# Patient Record
Sex: FEMALE | Race: BLACK OR AFRICAN AMERICAN | NOT HISPANIC OR LATINO | Employment: OTHER | URBAN - METROPOLITAN AREA
[De-identification: names, ages, dates, MRNs, and addresses within clinical notes are randomized per-mention and may not be internally consistent; named-entity substitution may affect disease eponyms.]

---

## 2017-05-17 ENCOUNTER — TELEPHONE (OUTPATIENT)
Dept: TRANSPLANT | Facility: CLINIC | Age: 52
End: 2017-05-17

## 2017-05-19 ENCOUNTER — DOCUMENTATION ONLY (OUTPATIENT)
Dept: TRANSPLANT | Facility: CLINIC | Age: 52
End: 2017-05-19

## 2017-05-19 NOTE — NURSING
Pt records reviewed.  Pt will be referred to Hepatology due to hcv cirrhosis. Initial referral received  From Tawanda Carroll's  office.   Referral letter sent to provider and patient.  Pt records reviewed.

## 2017-05-19 NOTE — LETTER
May 19, 2017    Tawanda Carroll  [address] [place] [id number]      Dear Dr. Carroll    Patient: Kasey Green   MR Number: [id number]   YOB: 1965     Thank you for the referral of Kasey Green to the [facility]. An initial appointment will be scheduled for your patient with one of our Hepatologists.      Thank you again for your trust in our program.  If there is anything we can do for you or your staff, please feel free to contact us.        Sincerely,        [facility]  [address] [place] [id number]  [phone]

## 2017-05-19 NOTE — LETTER
May 19, 2017    [facility]ase[facility] Green  [address] [facility] [address]      Dear Kasey Green:    Your doctor has referred you to the [facility]. You will be contacted by our office and an initial appointment will then be scheduled for you.    We look forward to seeing you soon. If you have any further questions, please contact us at [phone].       Sincerely,        [facility]   [address] [place] [id number]  [phone]

## 2017-05-25 ENCOUNTER — TELEPHONE (OUTPATIENT)
Dept: HEPATOLOGY | Facility: CLINIC | Age: 52
End: 2017-05-25

## 2017-05-25 NOTE — TELEPHONE ENCOUNTER
External records reviewed. HCV RNA and genotype 1a noted. CT abdomen done 8/24/16 suggestive of cirrhosis (Epic). Pt was admitted here in 8/2016 for GI bleed and HCV cirrhosis.    Spoke to pt. She needs appt in [place] with Dr. Ortiz. Informed pt that next available will likely be in July. Pt ok with waiting. She will need updated labs and imaging prior to appt. Order mailed to pt for CBC, CMP, INR, AFP, and U/s.   Asked that she have tests done by July 1st at [facility].  Verbalized understanding.     Pt will be contacted for scheduling once date is available.

## 2017-06-29 LAB — AFP-TUMOR MARKER: 12.7

## 2017-06-30 ENCOUNTER — TELEPHONE (OUTPATIENT)
Dept: HEPATOLOGY | Facility: CLINIC | Age: 52
End: 2017-06-30

## 2017-06-30 NOTE — TELEPHONE ENCOUNTER
Attempt made to reach patient.  Message from Dr. Ortiz left on her VM.  Ultrasound requested and report placed in provider folder for review by Dafne.

## 2017-06-30 NOTE — TELEPHONE ENCOUNTER
----- Message from Tiffanie Ortiz MD sent at 6/29/2017  3:23 PM CDT -----  Please inform patient tumor marker is slightly elevated, it could be due to hepatitis C.  Needs abdominal ultrasound.  Please Schedule.  Thanks

## 2017-07-07 ENCOUNTER — TELEPHONE (OUTPATIENT)
Dept: HEPATOLOGY | Facility: CLINIC | Age: 52
End: 2017-07-07

## 2017-07-07 NOTE — TELEPHONE ENCOUNTER
Pt had u/s done on 6/26. Report received from [facility], placed with external records. CBC, CMP, INR also requested (AFP has been entered in Epic)    Consult scheduled on 8/10 in [place]. Reminder mailed.

## 2017-07-11 LAB
ALBUMIN: 3.6
ALP ISOS SERPL LEV INH-CCNC: 108 U/L
ALT SERPL-CCNC: 47 U/L
ANION GAP SERPL CALC-SCNC: 13 MMOL/L
AST SERPL-CCNC: 74 U/L
BASOPHILS ABSOLUTE COUNT: 0 /ΜL
BASOPHILS NFR BLD: 1 % (ref 0–3)
BILIRUBIN, TOTAL: 1.1
BUN BLD-MCNC: 6 MG/DL (ref 4–21)
CALCIUM SERPL-MCNC: 9 MG/DL
CARBON DIOXIDE, CO2: 26
CHLORIDE: 103
CREAT SERPL-MCNC: 0.6 MG/DL
EOSINOPHIL NFR BLD: 5 %
EOSINOPHILS ABSOLUTE COUNT: 0 /ΜL
ERYTHROCYTE [DISTWIDTH] IN BLOOD BY AUTOMATED COUNT: 12.7 % (ref 11.5–14.5)
GFR: >90
GLUCOSE: 90
HCT VFR BLD AUTO: 38 % (ref 36–46)
HGB BLD-MCNC: 13.1 G/DL (ref 12–16)
IMMATURE GRANS (ABS): 0.01
IMMATURE GRANULOCYTES: 0.2
INR PPP: 1.1 (ref 2–3)
LYMPHOCYTES %: 41 % (ref 18–52)
LYMPHOCYTES ABSOLUTE COUNT: 2 /ΜL
MCH RBC QN AUTO: 32.9 PG (ref 26–34)
MCHC RBC AUTO-ENTMCNC: 35 G/DL (ref 30–37)
MCV RBC AUTO: 95.5 FL (ref 82–108)
MONOCYTES %: 9.1
MONOCYTES ABSOLUTE COUNT: 0.38
NEUTROPHILS ABSOLUTE COUNT: 2 /ΜL
NEUTROPHILS RELATIVE PERCENT: 45 % (ref 46–78)
NUCLEATED RBC/100 LEUKOCYTES: 0
PLATELET # BLD AUTO: 119 K/ΜL (ref 150–399)
PMV BLD AUTO: 10.5 FL (ref 7.5–11.5)
POTASSIUM: 3.6
PROTHROMBIN TIME: 12.3 S
RBC # BLD AUTO: 3.96 10^6/ΜL (ref 4–5.2)
SODIUM: 142
TOTAL PROTEIN: 8.8 G/DL (ref 6.4–8.2)
WBC: 4.2

## 2017-07-15 ENCOUNTER — TELEPHONE (OUTPATIENT)
Dept: HEPATOLOGY | Facility: CLINIC | Age: 52
End: 2017-07-15

## 2017-07-15 DIAGNOSIS — R74.01 ELEVATED TRANSAMINASE LEVEL: Primary | ICD-10-CM

## 2017-07-15 NOTE — TELEPHONE ENCOUNTER
Total protein 8.8, albumin 3.6, globulin 5.2 elevated.    Will get immunoglobulins quant, and SAMANTHA.  Pl send order to patient.

## 2017-07-25 ENCOUNTER — TELEPHONE (OUTPATIENT)
Dept: HEPATOLOGY | Facility: CLINIC | Age: 52
End: 2017-07-25

## 2017-07-25 DIAGNOSIS — B18.2 CHRONIC HEPATITIS C WITHOUT HEPATIC COMA: Primary | ICD-10-CM

## 2017-07-26 ENCOUNTER — DOCUMENTATION ONLY (OUTPATIENT)
Dept: TRANSPLANT | Facility: CLINIC | Age: 52
End: 2017-07-26

## 2017-07-26 NOTE — NURSING
Pt records reviewed.   Pt will be referred to Hepatitis C clinic    Initial referral received  from the workque.   Referring Provider/diagnosis  Tiffanie Ortiz MD    Diagnosis: Chronic hepatitis C without hepatic coma     Referral letter sent to provider and patient.

## 2017-08-10 ENCOUNTER — TELEPHONE (OUTPATIENT)
Dept: HEPATOLOGY | Facility: CLINIC | Age: 52
End: 2017-08-10

## 2017-08-10 NOTE — TELEPHONE ENCOUNTER
----- Message from Darlene Alvarez sent at 8/10/2017  9:43 AM CDT -----  Contact: PT  PT would like to reschedule her appt from today, due to transportation.    Call [phone]

## 2017-08-10 NOTE — TELEPHONE ENCOUNTER
MA called patient back, she said that the transportation people called her to let her know that nobody can bring her to her appt today. Reschedule her appt. Mailed new appt reminder to patient.CLOVER

## 2017-10-12 ENCOUNTER — EPISODE CHANGES (OUTPATIENT)
Dept: HEPATOLOGY | Facility: CLINIC | Age: 52
End: 2017-10-12

## 2017-10-12 ENCOUNTER — INITIAL CONSULT (OUTPATIENT)
Dept: HEPATOLOGY | Facility: CLINIC | Age: 52
End: 2017-10-12
Payer: MEDICAID

## 2017-10-12 VITALS
RESPIRATION RATE: 18 BRPM | SYSTOLIC BLOOD PRESSURE: 176 MMHG | BODY MASS INDEX: 21.62 KG/M2 | WEIGHT: 122 LBS | HEART RATE: 60 BPM | DIASTOLIC BLOOD PRESSURE: 116 MMHG | HEIGHT: 63 IN

## 2017-10-12 DIAGNOSIS — R93.89 ABNORMAL FINDING ON IMAGING: ICD-10-CM

## 2017-10-12 DIAGNOSIS — K74.60 CIRRHOSIS OF LIVER WITHOUT ASCITES, UNSPECIFIED HEPATIC CIRRHOSIS TYPE: ICD-10-CM

## 2017-10-12 DIAGNOSIS — B18.2 CHRONIC HEPATITIS C WITHOUT HEPATIC COMA: Primary | ICD-10-CM

## 2017-10-12 LAB
ALBUMIN: 3
ALP ISOS SERPL LEV INH-CCNC: 101 U/L
ALT SERPL-CCNC: 53 U/L
ANION GAP SERPL CALC-SCNC: 6 MMOL/L
AST SERPL-CCNC: 78 U/L
BASOPHILS ABSOLUTE COUNT: 0 /ΜL
BASOPHILS NFR BLD: 1 % (ref 0–3)
BILIRUBIN, TOTAL: 1.3
BUN BLD-MCNC: 7 MG/DL (ref 4–21)
CALCIUM SERPL-MCNC: 9.2 MG/DL
CARBON DIOXIDE, CO2: 25
CHLORIDE: 102
CREAT SERPL-MCNC: 0.9 MG/DL
EGFR IF AFRICAN AMERICAN: >60
EOSINOPHIL NFR BLD: 1.8 %
EOSINOPHILS ABSOLUTE COUNT: 0 /ΜL
ERYTHROCYTE [DISTWIDTH] IN BLOOD BY AUTOMATED COUNT: 44.7 %
EST. GFR  (NON AFRICAN AMERICAN): NORMAL MG/DL
GFR MDRD AF AMER: 83
GFR MDRD NON AF AMER: 69
GLUCOSE: 65
HCT VFR BLD AUTO: 38 % (ref 36–46)
HGB BLD-MCNC: 13 G/DL (ref 12–16)
IMMATURE GRANS (ABS): 0.02
IMMATURE GRANULOCYTES: 0.5
INR PPP: 1.1
LYMPH%: 49 % (ref 18–52)
LYMPHOCYTES ABSOLUTE COUNT: 2 /ΜL
MCH RBC QN AUTO: 32.1 PG
MCHC RBC AUTO-ENTMCNC: 34.4 G/DL
MCV RBC AUTO: 93.3 FL
MONO%: 9 % (ref 3–10)
MONOCYTES ABSOLUTE COUNT: 0.35
NEUTROPHILS - MAN (DIFF): 40
NEUTROPHILS ABSOLUTE COUNT: 2 /ΜL
NRBC: 0
NUCLEATED RBC-MANUAL: 0
PLATELETS: 80
POTASSIUM: 3.43
PROT SERPL-MCNC: 8.8 G/DL
PROTHROMBIN TIME: 12.9 S
RBC # BLD AUTO: 4.05 10^6/ΜL (ref 4–5.2)
SODIUM: 133
WBC: 3.87

## 2017-10-12 PROCEDURE — 99215 OFFICE O/P EST HI 40 MIN: CPT | Mod: S$GLB,,, | Performed by: INTERNAL MEDICINE

## 2017-10-12 PROCEDURE — 3008F BODY MASS INDEX DOCD: CPT | Mod: S$GLB,,, | Performed by: INTERNAL MEDICINE

## 2017-10-12 NOTE — PATIENT INSTRUCTIONS
-  Labs today:    CBC, CMP, PT INR, AFP,   -  Triple phase CT of the abdomen to be done locally at [facility] in [facility].  -  Submit CD with the above CT and the that was performed in the emergency room to IR conference for further evaluation of the area in the left lobe of the liver, and recommendation for further treatment/follow-up.

## 2017-10-12 NOTE — Clinical Note
-  Labs today:   CBC, CMP, PT INR, AFP,  -  Triple phase CT of the abdomen to be done locally at [facility] in [facility]. -  Submit CD with the above CT and the that was performed in the emergency room to IR conference for further evaluation of the area in the left lobe of the liver, and recommendation for further treatment/follow-up. neurologist to switch her medication, phenytoin, to another anti-seizure medication, observed 2-3 months for seizure recurrence on the new medication, and then return for hepatitis C treatment if no masses seen in the liver.   - CBC, CMP, PT INR today, followed by every 3 months -  Ultrasound of abdomen and AFP every 6 months, next due in April 2018   Return in 6-8 weeks.

## 2017-10-16 ENCOUNTER — TELEPHONE (OUTPATIENT)
Dept: HEPATOLOGY | Facility: CLINIC | Age: 52
End: 2017-10-16

## 2017-10-16 NOTE — TELEPHONE ENCOUNTER
MA attempted to call patient back she is unable to reached left her VM to please give us a callback .Devan

## 2017-10-16 NOTE — TELEPHONE ENCOUNTER
----- Message from Dave Stewart sent at 10/16/2017  3:13 PM CDT -----  Contact: patient   Patient have a medical question about her medication.      Please call [phone]      Thanks!!!

## 2017-10-18 ENCOUNTER — TELEPHONE (OUTPATIENT)
Dept: HEPATOLOGY | Facility: CLINIC | Age: 52
End: 2017-10-18

## 2017-10-18 LAB — AFP-TUMOR MARKER: 43

## 2017-10-18 NOTE — TELEPHONE ENCOUNTER
MA called patient back, patient stated that she talked to her Neurology MD about switching her Dilantin to Keppra per Dr. Ortiz.     She said that her Neurology MD would like a note from Dr. Ortiz on WHY he have to change her seisuze medicine? Per neurology MD he have a lot of patient that are on Hep C treatment taking the same medicine.     Route message to MD

## 2017-10-18 NOTE — TELEPHONE ENCOUNTER
----- Message from Darlene Alvarez sent at 10/17/2017 10:28 AM CDT -----  Contact: PT  Returning call, [phone]

## 2017-10-19 ENCOUNTER — TELEPHONE (OUTPATIENT)
Dept: HEPATOLOGY | Facility: CLINIC | Age: 52
End: 2017-10-19

## 2017-10-19 DIAGNOSIS — R77.2 ELEVATED AFP: Primary | ICD-10-CM

## 2017-10-19 DIAGNOSIS — K74.60 CIRRHOSIS OF LIVER WITHOUT ASCITES, UNSPECIFIED HEPATIC CIRRHOSIS TYPE: ICD-10-CM

## 2017-10-19 NOTE — TELEPHONE ENCOUNTER
Pl inform patient:  Tumor marker has increased from 13 to 43, may be due to hep C, or due to cirrhosis, or due to tumor in the liver.      Need triple phase CT scan of the abdomen ASAP to see if there is tumor in the liver. Pl schedule. Order placed.

## 2017-10-20 ENCOUNTER — TELEPHONE (OUTPATIENT)
Dept: HEPATOLOGY | Facility: CLINIC | Age: 52
End: 2017-10-20

## 2017-10-20 NOTE — TELEPHONE ENCOUNTER
MA called patient to inform her of her tumor marker results. Patient understood stated that she already schedule her CT-SCAN on 10/27 at 8:00 at [facility]. CLOVER

## 2017-10-20 NOTE — TELEPHONE ENCOUNTER
----- Message from Tiffanie Ortiz MD sent at 10/15/2017  9:56 AM CDT -----  Pl inform pt:  Liver enzymes mildly elevated consistent with hep C.  Liver and kidney working fairly well, as indicated by low MELD (8).  Tumor marker was mildly elevated in June, waiting for CT scan (gave paper order to patient during clinic visit). Pl send us the CD with CT, (and abd u/s, MRI, if any) for review in IR conference.

## 2017-10-23 ENCOUNTER — TELEPHONE (OUTPATIENT)
Dept: HEPATOLOGY | Facility: CLINIC | Age: 52
End: 2017-10-23

## 2017-10-23 NOTE — TELEPHONE ENCOUNTER
MA called patient inform her of her lab results below. Patient understood and verbalized understanding.Devan

## 2017-10-27 ENCOUNTER — TELEPHONE (OUTPATIENT)
Dept: HEPATOLOGY | Facility: CLINIC | Age: 52
End: 2017-10-27

## 2017-10-27 NOTE — TELEPHONE ENCOUNTER
----- Message from Darlene Alvarez sent at 10/26/2017  3:02 PM CDT -----  Contact: [facility]  Need order for CT, list of meds and lab results, patient has an appt tomorrow.     Fax   [place]

## 2017-11-03 ENCOUNTER — TELEPHONE (OUTPATIENT)
Dept: HEPATOLOGY | Facility: CLINIC | Age: 52
End: 2017-11-03

## 2017-11-03 NOTE — TELEPHONE ENCOUNTER
CT-SCAN DISC DATED 10/27 SENT TO THE 2ND FLOOR RADIOLOGY TO BE LOADED FOR NEXT IR CONF 11/7.   
no abdominal pain, no constipation, no diarrhea, no nausea and no vomiting.

## 2017-11-03 NOTE — TELEPHONE ENCOUNTER
.  Patient: Gagan Green       MRN: [id number]      : 1965     Age: 51 y.o.  [address]  [facility] [address]    Provider: Hepatologist Fabiano Ortiz    Patient Transplant Status: Other pending IR review    Reason for presentation: Initial staging for transplant    Clinical Summary: Patient with cirrhosis 2/2 ETOH abuse and Hepatitis C, rheumatoid arthritis, HTN, and seizure disorder on phenytoin, is referred here for hep C treatment.    hepatitis C was diagnosed approximately 2 years ago, she remains treatment naïve, she also has a history of alcohol in the past, her genotype is 1A, viral load was 5.1 million international units per mL in 2016, ultrasound of the abdomen from 2017 showed a nodular liver, borderline enlarged spleen, no mass was mentioned in an ultrasound.      weekend on , patient went to the local emergency room at [facility] in [place], [place], for severe right-sided abdominal pain.  A CT scan performed without contrast for a renal stone study showed a possible mass in the left lobe of the liver, due to the lack of contrast could not be confirmed.  Also seen were findings consistent with cirrhosis and mild enlargement of the spleen     Imaging to be reviewed: CT 10/27/17    HCC Treatment History: None    ABO: A POS    Platelets:   Lab Results   Component Value Date/Time     (A) 2017     Creatinine:   Lab Results   Component Value Date/Time    CREATININE 0.9 10/12/2017     Bilirubin:   Lab Results   Component Value Date/Time    BILITOT 2.3 (H) 2016 04:50 AM    BILITOT 2.3 (H) 2016 04:50 AM     AFP Last 3 each if available:   Lab Results   Component Value Date/Time    AFP 3.8 2016 04:43 PM       MELD: Computed MELD-Na score unavailable. Necessary lab results were not found in the last year.  Computed MELD score unavailable. Necessary lab results were not found in the last year.    Plan: CT from 10/27/17 shows a vague 3.6  cm area of hyperattenuation in the let lobe on non contrast portion with ? Washout and possible subtle enhancement.  Hounsfield units are a little higher that the liver parenchyma.  This is indeterminate. Rec MRI now.      Order entered by MD and note forwarded to hepatology staff to schedule.    Follow-up Provider: Tiffanie Ortiz MD

## 2017-11-05 ENCOUNTER — TELEPHONE (OUTPATIENT)
Dept: HEPATOLOGY | Facility: CLINIC | Age: 52
End: 2017-11-05

## 2017-11-05 NOTE — TELEPHONE ENCOUNTER
When I saw patient on October 12, 2017, she brought a report of an abdominal CT scan dated October 8, 2017, which mentioned a possible mass in the left lobe of the liver not adequately demonstrated on this noncontrast study.  Could be benign or malignant.  I requested a triple phase CT scan wound at the local hospital, downloaded on a CD, and sent to us at our address on [facility].    Please contact patient to see if she had this done and obtain the CD, so it can be submitted to the IR conference for review.  Thank you

## 2017-11-07 ENCOUNTER — TELEPHONE (OUTPATIENT)
Dept: HEPATOLOGY | Facility: CLINIC | Age: 52
End: 2017-11-07

## 2017-11-07 ENCOUNTER — CONFERENCE (OUTPATIENT)
Dept: TRANSPLANT | Facility: CLINIC | Age: 52
End: 2017-11-07

## 2017-11-07 DIAGNOSIS — R16.0 LIVER MASS: Primary | ICD-10-CM

## 2017-11-07 NOTE — TELEPHONE ENCOUNTER
Patient's liver imaging reviewed in IR conf today, 11/7/17.   Discussion:  Outside CT from 10/27/17 shows a vague 3.6 cm area of hyperattenuation in the let lobe on non contrast portion with ? Washout and possible subtle enhancement. This is indeterminate. Rec MRI now.    Order placed for MRI w/wo contrast, Pl schedule MRI at main ampus if patient agreeable, otherwise, send order to do it locally.

## 2017-11-08 NOTE — TELEPHONE ENCOUNTER
MA called patient inform him that per Dr. Ortiz she need to have MRI done. Mailed her the external order with a request to the DISC to be mail to us ASAP.     Fed ex info was given.     Patient will call [facility] [place] to schedule her MRI.     CLOVER

## 2017-11-09 ENCOUNTER — TELEPHONE (OUTPATIENT)
Dept: HEPATOLOGY | Facility: CLINIC | Age: 52
End: 2017-11-09

## 2017-11-09 ENCOUNTER — EPISODE CHANGES (OUTPATIENT)
Dept: HEPATOLOGY | Facility: CLINIC | Age: 52
End: 2017-11-09

## 2017-11-09 NOTE — TELEPHONE ENCOUNTER
Dr Carroll , patient's PCP, called [facility] clinic nurse, he reported neurologist ordered MRI wo contrast, which was done on 10/18/17, it showed diffuse patchy intermediate T1, T2 signal intensity throughout bone marrow which is concerning for metastatic bone marrow disease.  Splenomegaly. Disc bulge L4-L5.     I had requested MRI w/wo contrast of abd, due to indeterminate lesion in the liver, that is still pending. If mets exist, she will not be a candidate for liver transplant.

## 2017-11-15 ENCOUNTER — TELEPHONE (OUTPATIENT)
Dept: HEPATOLOGY | Facility: CLINIC | Age: 52
End: 2017-11-15

## 2017-11-15 NOTE — TELEPHONE ENCOUNTER
----- Message from Eleanor Fish sent at 11/14/2017 11:25 AM CST -----  Contact: [facility]/April  Needs a call patient is having an MRI and it needs a PA done prior please call @ # [phone]  Lunch there from 12-1

## 2017-11-15 NOTE — TELEPHONE ENCOUNTER
MA called April back, she need PA for patient MRI patient is not scheduled yet they need PA for now then they are going to schedule patient     [facility]    PHONE# [phone]  FAX# [phone]    TAX ID # [id number]        PA forward to MIRTHA VOGEL for approval. CLOVER

## 2017-11-30 ENCOUNTER — TELEPHONE (OUTPATIENT)
Dept: HEPATOLOGY | Facility: CLINIC | Age: 52
End: 2017-11-30

## 2017-11-30 NOTE — TELEPHONE ENCOUNTER
MA called patient back, she said that we have to fax her PA    [facility]     PHONE# [phone]  FAX# [phone]     TAX ID # [id number]    Faxed authorization to the fax # above. ATTENTION April.

## 2017-11-30 NOTE — TELEPHONE ENCOUNTER
----- Message from Janice Lang sent at 11/30/2017  3:31 PM CST -----  Contact: PT  PT need to schedule appt for MRI    Please call [phone]    Thanks Janice

## 2017-12-14 ENCOUNTER — OFFICE VISIT (OUTPATIENT)
Dept: HEPATOLOGY | Facility: CLINIC | Age: 52
End: 2017-12-14
Payer: MEDICAID

## 2017-12-14 DIAGNOSIS — R93.2 ABNORMAL LIVER DIAGNOSTIC IMAGING: ICD-10-CM

## 2017-12-14 DIAGNOSIS — K70.30 ALCOHOLIC CIRRHOSIS OF LIVER WITHOUT ASCITES: Primary | ICD-10-CM

## 2017-12-14 DIAGNOSIS — B18.2 HEP C W/O COMA, CHRONIC: ICD-10-CM

## 2017-12-14 PROCEDURE — 99214 OFFICE O/P EST MOD 30 MIN: CPT | Mod: S$GLB,,, | Performed by: INTERNAL MEDICINE

## 2017-12-14 NOTE — PATIENT INSTRUCTIONS
Recommendations:  -  Get Spine and abd mRIs on disc  -  Submit CD to IR conference for further evaluation of the area in the left lobe of the liver, and recommendation for further treatment/follow-up.  -  Patient has been advised to see her neurologist to switch her medication, phenytoin, to another anti-seizure medication, observed 2-3 months for seizure recurrence on the new medication, and then return for hepatitis C treatment if no masses seen in the liver.    -  If a mass is seen in the liver and no bone mets, we prefer to wait until a possible liver transplant for instituting hepatitis C treatment.  -  If she has bone mets, she needs to see oncologist.   -  Low salt in the diet, avoid canned, bottled and processed foods.  -  Continue current meds  -  CBC, CMP, PT INR today, followed by every 3 months  -  Ultrasound of abdomen and AFP every 6 months, next due in April 2018  -  Avoid alcohol, smoking, sedatives and meds with codeine.  -  No driving  -  Avoid high intake of Tylenol (more than 4 extra-strength pills in one day)  -  Call us if any bleeding, fevers, confusion, disorientation occur  -  Endoscopy: Per local GI physician  -  Transplant option discussed, will evaluate if masses seen in the liver or when MELD >15.  -  Return in 6-8 weeks.

## 2017-12-14 NOTE — Clinical Note
Recommendations: -  Get Spine and abd mRIs on disc -  Submit CD to IR conference for further evaluation of the area in the left lobe of the liver, and recommendation for further treatment/follow-up. -  Patient has been advised to see her neurologist to switch her medication, phenytoin, to another anti-seizure medication, observed 2-3 months for seizure recurrence on the new medication, and then return for hepatitis C treatment if no masses seen in the liver.   -  If a mass is seen in the liver and no bone mets, we prefer to wait until a possible liver transplant for instituting hepatitis C treatment. -  If she has bone mets, she needs to see oncologist.  -  CBC, CMP, PT INR today, followed by every 3 months -  Ultrasound of abdomen and AFP every 6 months, next due in April 2018 -  Return in 6-8 weeks.

## 2017-12-14 NOTE — PROGRESS NOTES
[facility] Follow-up Note    Reason for Consult:  The primary encounter diagnosis was Alcoholic cirrhosis of liver without ascites. Diagnoses of Hep C w/o coma, chronic and Abnormal liver diagnostic imaging were also pertinent to this visit.    PCP: Primary Doctor No       HPI:  This is a 52 y.o. female here for evaluation of: hep C.    Patient with cirrhosis 2/2 ETOH abuse and Hepatitis C, rheumatoid arthritis, HTN, and seizure disorder on phenytoin, is referred here for hep C treatment.      Offers no new symptoms, continues to have chr back pain.    Her hepatitis C was diagnosed approximately 2 years ago, she remains treatment naïve, she also has a history of alcohol in the past, her genotype is 1A, viral load was 5.1 million international units per mL in 2016, ultrasound of the abdomen from June 26, 2017 showed a nodular liver, borderline enlarged spleen, no mass was mentioned in an ultrasound.      Her labs on June 26, 2017 are significant for AST 74, ALT 47, total bilirubin 1.1, alpha-fetoprotein 12.7, INR 1.2, creatinine normal, GFR greater than 60.  Her hepatitis surface antibody is positive, hepatitis B antibody total was positive, HIV was negative, hemoglobin A1c was 4.7, rheumatoid arthritis factor was positive.    On October 8/9, 2017, patient went to the local emergency room at [facility] in [place], [place], for severe right-sided abdominal pain which originated in the low back and radiated towards the right flank and to the right side of the abdomen. A CT scan performed without contrast for a renal stone study showed a possible mass in the left lobe of the liver, due to the lack of contrast could not be confirmed.  Also seen were findings consistent with cirrhosis and mild enlargement of the spleen.    Interval History:  Dr Carroll , patient's PCP Phone [phone], called [facility] clinic nurse, he reported neurologist Dr. Jose A Huff (phone [phone]) had  ordered MRI wo contrast, which was done on 10/18/17, it showed diffuse patchy intermediate T1, T2 signal intensity throughout bone marrow which is concerning for metastatic bone marrow disease.  Splenomegaly. Disc bulge L4-L5.      I had requested MRI w/wo contrast of abd, due to indeterminate lesion in the liver, that is still pending. If mets exist, she will not be a candidate for liver transplant.     Previous admission to [facility] for UGI bleed 8/30/16:  Transferred from [facility] for evaluation of BRBPR. Patient was admitted to the ICU there after having 3 seizures at home and found to be in septic shock 2/2 E coli bacteremia. Patient subsequently developed DIC, which improved while she was treated for pneumonia with Vanc and Zosyn. On hospital admission Day#5, patient presented with abdominal distention and nausea and imaging showing high-grade small bowel obstruction. After failing conservative management, she underwent an ex-lap with SB partial resection and lysis of adhesion on 8/9. Postop course was complicated by worsening DIC requiring multiple transfusions of pRBC, FFP, and cryo. Patient also developed maroon-colored stools, and was managed with octretide and protonix gtt.      Scopes  EGD 8/17 - esophageal varices but no active bleeding, no report of bands being placed   Colonoscopy 8/20 - poor prep, there was evidence of old blood but no active bleeding     Course of Principle Problem for Admission:   Patient had repeat endoscopy performed on admission here to [facility] due to recurrent hematochezia with anemia requiring transfusion. EGD showed evidence of grade i esophageal varices, portal hypertensive gastropathy, normal duodenum. Colonoscopy showed evidence of poor prep, with blood in the entire examined colon with no bleeding site found, thought to be small bowel in origin with most likely source being the anastomosis. Hg remained stable since that time with no recurrent episodes of  hematochezia, as such patient will be transferred back to [facility] for continued care from her surgeons who performed recent bowel resection. Coagulopathy managed while inpatient with PRN transfusions of cryoprecipitate and FFP to maintain INR<1.5.     Other Medical Problems Addressed in the Hospital:   Patient also completed treatment of E Coli sepsis, and will transferred on PO Cipro daily for SBP prophylaxis despite no evidence of ascites amenable to paracentesis.        Pertinent/Significant Diagnostic Studies:    1. EGD/Colonoscopy: detailed above  2. CT Abdomen: Postsurgical bowel changes, noting mildly prominent small bowel loops in the midabdomen, increased in caliber as compared to the previous examination. This may reflect reactive ileus in this patient with suspected intra-abdominal hematoma and ascites, differential would include slow flow through the anastomotic site or possibly partial small bowel obstruction on the basis of adhesions although no sharp transition point seen. No free air or extraluminal contrast extravasation appreciated.      Elevated liver enzymes: Yes  Abnormal imaging: Yes  Cirrhosis: Yes  Hepatitis C: Yes  Hepatitis B: No  Fatty liver: Yes  Encephalopathy: No  Post-hospital discharge: No  Symptoms: none    Primary hepatic manifestations:  Fatigue:No  Edema:No  Ascites:No  Encephalopathy:No  Abdominal pain:No  GI bleeds: Yes  Pruritus:No  Weight Changes:No  Changes in Bowel habits: No  Muscle cramps:No    Risk factors for liver disease:  No jaundice  received transfusions in 2016 for UGI bleed  No IVDU  Did not snort cocaine or similar agents  Did not live with anyone with hepatitis B or C  Sexual partner not tested  No hepatotoxic medications  No exposure to industrial toxins  Alcohol: used to drink, stopped 6 months ago, approx 4/2017      ROS:  Constitutional: No fevers, chills, weight changes, fatigue  ENT: No allergies, nosebleeds,   CV: No chest pain  Pulm:  No cough, shortness of breath  Ophtho: No vision changes  GI/Liver: see HPI  Derm: No rash, itching  Heme: No swollen glands, bruising  MSK: No joint pains, joint swelling  : No dysuria, hematuria, decrease in urine output  Endo: No hot or cold intolerance  Neuro: No confusion, disorientation, difficulty with sleep, memory, concentration, syncope, seizure  Psych: No anxiety, depression    Medical History:  has a past medical history of Arthritis; Chronic pain; Cirrhosis of liver; Hepatitis; Hypertension; and Seizures.    Surgical History:  has a past surgical history that includes Hysterectomy; Hernia repair; Cholecystectomy; Abdominal surgery; and Colonoscopy (N/A, 8/25/2016).    Family History: family history is not on file..     Social History:  reports that she has quit smoking. She does not have any smokeless tobacco history on file. She reports that she drinks alcohol. She reports that she does not use drugs.    Review of patient's allergies indicates:  No Known Allergies    Current Outpatient Prescriptions   Medication Sig    levetiracetam (KEPPRA) 500 MG Tab Take 500 mg by mouth once daily.    megestrol (MEGACE) 400 mg/10 mL (40 mg/mL) Susp Take 800 mg by mouth once daily.    ondansetron HCl, PF, 4 mg/2 mL Soln Inject 8 mg into the vein every 8 (eight) hours as needed.    pantoprazole (PROTONIX) 40 MG tablet Take 1 tablet (40 mg total) by mouth once daily.    phenytoin (DILANTIN) 100 MG ER capsule Take 100 mg by mouth 3 (three) times daily.     propranolol (INDERAL) 20 MG tablet Take 1 tablet (20 mg total) by mouth 2 (two) times daily.    ramelteon (ROZEREM) 8 mg tablet Take 1 tablet (8 mg total) by mouth nightly as needed for Insomnia.    senna-docusate 8.6-50 mg (PERICOLACE) 8.6-50 mg per tablet Take 1 tablet by mouth once daily.     No current facility-administered medications for this visit.        Objective Findings:    Vital Signs:  LMP  (LMP Unknown)   There is no height or weight on file to  calculate BMI.    Physical Exam:  General Appearance: Well appearing in no acute distress  Head:   Normocephalic, without obvious abnormality  Eyes:    No scleral icterus, EOMI  ENT: Neck supple, Lips, mucosa, and tongue normal; teeth and gums normal  Lungs: CTA bilaterally in anterior and posterior fields, no wheezes, no crackles.  Heart:  Regular rate and rhythm, S1, S2 normal, no murmurs heard  Abdomen: Soft, non tender, non distended with positive bowel sounds in all four quadrants. No hepatosplenomegaly, ascites, or mass  Extremities: 2+ pulses, no clubbing, cyanosis or edema  Skin: No rash  Neurologic: CN II-XII intact, alert, oriented x 3. No asterixis      Labs:  Lab Results   Component Value Date    WBC 3.87 10/12/2017    HGB 13.0 10/12/2017    HCT 38 10/12/2017     (A) 06/26/2017    INR 1.1 10/12/2017    CREATININE 0.9 10/12/2017    BUN 7 10/12/2017    BILITOT 2.3 (H) 08/30/2016    BILITOT 2.3 (H) 08/30/2016    ALT 53 10/12/2017    AST 78 10/12/2017    ALKPHOS 55 08/30/2016    ALKPHOS 55 08/30/2016     10/12/2017    K 3.43 10/12/2017     10/12/2017    CO2 25 10/12/2017    TSH 2.761 08/20/2016    AFP 3.8 08/21/2016       Imaging:       Endoscopy:      Assessment:  1. Alcoholic cirrhosis of liver without ascites    2. Hep C w/o coma, chronic    3. Abnormal liver diagnostic imaging         Hepatitis C genotype 1A, treatment naïve, needs to be treated after confirming the status of the liver mass that was seen on a CT scan on October 8/9 2017, and follow-up MRI on Dec 4, 2017.  If she has confirmed HCC, would wait until after potential transplant for treatment for hep C will be started.  If, on the other hand, there is no mass found in the liver, then we will proceed with Hep C treatment.  However, she needs to be off the phenytoin and switch to another anti-seizure medication which will not interact with Hep C medications.      CT reviewed in IR conf on 11/7/17:  Plan: CT from 10/27/17  shows a vague 3.6 cm area of hyperattenuation in the let lobe on non contrast portion with ? Washout and possible subtle enhancement.  Hounsfield units are a little higher that the liver parenchyma.  This is indeterminate. Rec MRI now. it was finally done on 12/4/17, and no enhancing mass was seen on MRI.       In order to make the switch of anti-seizure medications, she saw a neurologist, Dr Huff, who obtained and mRI of spine because of chronic back pain.. Patient unaware of any positive mRI spine findings but her PCP, Dr Carroll ([phone]) called our [facility] nurse and reported that neurologist Dr. Jose A Huff (phone [phone]) had ordered MRI wo contrast, which was done on 10/18/17, it showed diffuse patchy intermediate T1, T2 signal intensity throughout bone marrow which is concerning for metastatic bone marrow disease.  Splenomegaly. Disc bulge L4-L5.       Recommendations:  -  Get Spine and abd mRIs on disc  -  Submit CD to IR conference for further evaluation of the area in the left lobe of the liver, and recommendation for further treatment/follow-up.  -  Patient has been advised to see her neurologist to switch her medication, phenytoin, to another anti-seizure medication, observed 2-3 months for seizure recurrence on the new medication, and then return for hepatitis C treatment if no masses seen in the liver.    -  If a mass is seen in the liver and no bone mets, we prefer to wait until a possible liver transplant for instituting hepatitis C treatment.  -  If she has bone mets, she needs to see oncologist.   -  Low salt in the diet, avoid canned, bottled and processed foods.  -  Continue current meds  -  CBC, CMP, PT INR today, followed by every 3 months  -  Ultrasound of abdomen and AFP every 6 months, next due in April 2018  -  Avoid alcohol, smoking, sedatives and meds with codeine.  -  No driving  -  Avoid high intake of Tylenol (more than 4 extra-strength pills in one day)  -  Call  us if any bleeding, fevers, confusion, disorientation occur  -  Endoscopy: Per local GI physician  -  Transplant option discussed, will evaluate if masses seen in the liver or when MELD >15.  -  Return in 6-8 weeks.          Return in about 8 weeks (around 2/8/2018).      Order summary:  No orders of the defined types were placed in this encounter.      Thank you so much for allowing me to participate in the care of Gagan Ortiz MD

## 2017-12-21 ENCOUNTER — EPISODE CHANGES (OUTPATIENT)
Dept: HEPATOLOGY | Facility: CLINIC | Age: 52
End: 2017-12-21

## 2017-12-26 ENCOUNTER — TELEPHONE (OUTPATIENT)
Dept: HEPATOLOGY | Facility: CLINIC | Age: 52
End: 2017-12-26

## 2017-12-26 DIAGNOSIS — B18.2 CHRONIC HEPATITIS C WITH HEPATIC COMA: ICD-10-CM

## 2017-12-26 NOTE — TELEPHONE ENCOUNTER
Reviewed MRI abd report w/wo contrast 12/4/17:  Nodular liver, without discrete hyperenhancing arterial mass or lesion on delayed imaging. No eovist defect on delayed imaging. Splenomegaly.    Pl inform pt:  MRI of abd showed cirrhosis but no tumor seen in the liver.

## 2017-12-29 ENCOUNTER — TELEPHONE (OUTPATIENT)
Dept: HEPATOLOGY | Facility: CLINIC | Age: 52
End: 2017-12-29

## 2017-12-29 NOTE — TELEPHONE ENCOUNTER
MA called pt to schedule U/S-Abdomen. Pt's daughter picked up and said she would get her mother to call back to schedule. MA gave pt's daughter clinic phone #.    AEZ

## 2018-01-02 ENCOUNTER — TELEPHONE (OUTPATIENT)
Dept: HEPATOLOGY | Facility: CLINIC | Age: 53
End: 2018-01-02

## 2018-01-02 DIAGNOSIS — K74.60 CIRRHOSIS OF LIVER WITHOUT ASCITES, UNSPECIFIED HEPATIC CIRRHOSIS TYPE: Primary | ICD-10-CM

## 2018-01-05 ENCOUNTER — TELEPHONE (OUTPATIENT)
Dept: HEPATOLOGY | Facility: CLINIC | Age: 53
End: 2018-01-05

## 2018-01-05 ENCOUNTER — EPISODE CHANGES (OUTPATIENT)
Dept: HEPATOLOGY | Facility: CLINIC | Age: 53
End: 2018-01-05

## 2018-01-05 DIAGNOSIS — B18.2 CHRONIC HEPATITIS C WITHOUT HEPATIC COMA: ICD-10-CM

## 2018-01-05 NOTE — TELEPHONE ENCOUNTER
MA called patient to inform her that we have to cancel her 2/8/18 appt for now per Dr. Ortiz. She will need to see her Neurologist first and have her DILANTIN switched to a different medication before Dr. Ortiz can order her Hepatitis C treatment medication.     She is unable to reached left her VM to please give us a callback. CLOVER

## 2018-01-05 NOTE — TELEPHONE ENCOUNTER
Hep C, cirrhosis, brooke 1a, 125,100 IU/mL in August 2016. AFP increased from 12.7 in June 2017  to 43 in Oct 2017.    CT renal stone study on Oct 8/9 2017 showed:  a possible mass in the left lobe of the liver, due to the lack of contrast could not be confirmed.   MRI abd w/wo contrast 12/4/17: no discrete hyperenhancing lesion in the liver.     Mavyret x 12 weeks planned for hep C treatment.    But, patient needs to see neurologist first to see if she can be switched form dilantin to another anti-seizure med. We are calling Dr Huff neurologist if she can be switched.

## 2018-01-09 NOTE — TELEPHONE ENCOUNTER
Patient called back, inform patient of the message below. Patient understood.     Faxed office visit notes to her Neurologist Dr. Sunshine    Phone# [phone]  Fax# [phone]

## 2018-02-01 LAB
25(OH)D3 SERPL-MCNC: 34.8 NG/ML
ALBUMIN/GLOBULIN RATIO: 0.5
ALBUMIN: 2.9
ALP ISOS SERPL LEV INH-CCNC: 93 U/L
ALT SERPL-CCNC: 41 U/L
AST SERPL-CCNC: 59 U/L
BASOPHILS ABSOLUTE COUNT: 0 /ΜL
BASOPHILS NFR BLD: 0 %
BILIRUBIN, TOTAL: 0.8
BUN BLD-MCNC: 10 MG/DL (ref 4–21)
BUN/CREAT SERPL: 11
CALCIUM SERPL-MCNC: 8.8 MG/DL
CARBON DIOXIDE, CO2: 25
CHLORIDE: 89
CREAT SERPL-MCNC: 0.9 MG/DL
EOSINOPHILS ABSOLUTE COUNT: 0 /ΜL
ERYTHROCYTE [DISTWIDTH] IN BLOOD BY AUTOMATED COUNT: 14.9 % (ref 11.5–14.5)
GFR MDRD AF AMER: 89
GFR MDRD NON AF AMER: 77
GLOBULIN: 5.4
GLUCOSE: 81
HCT VFR BLD AUTO: 36 % (ref 36–46)
HGB BLD-MCNC: 12.8 G/DL (ref 12–16)
IMMATURE GRANS (ABS): 0
IMMATURE GRANULOCYTES: 0
LYMPHOCYTES %: 59 % (ref 18–52)
LYMPHOCYTES ABSOLUTE COUNT: 3 /ΜL
MCH RBC QN AUTO: 32.2 PG (ref 26–34)
MCHC RBC AUTO-ENTMCNC: 35 G/DL (ref 30–37)
MCV RBC AUTO: 92 FL (ref 82–108)
MONOCYTES %: 9
MONOCYTES ABSOLUTE COUNT: 0.4
NEUTROPHILS ABSOLUTE COUNT: 1 /ΜL
NEUTROPHILS RELATIVE PERCENT: 30 % (ref 46–78)
PLATELET # BLD AUTO: 133 K/ΜL (ref 150–399)
POTASSIUM: 3.6
RBC # BLD AUTO: 3.97 10^6/ΜL (ref 4–5.2)
SISOMICIN TITR SBT: 2 % (ref 0–6)
SODIUM: 129
TOTAL PROTEIN: 8.3 G/DL (ref 6.4–8.2)
WBC: 4.8

## 2018-02-05 ENCOUNTER — TELEPHONE (OUTPATIENT)
Dept: HEPATOLOGY | Facility: CLINIC | Age: 53
End: 2018-02-05

## 2018-02-05 NOTE — TELEPHONE ENCOUNTER
----- Message from Tiffanie Ortiz MD sent at 2/2/2018  6:25 PM CST -----  Labs are stable. Pl inform pt.  Waiting for anti-seizure med to be changed under Dr Oppenheimer, neurologist's, care in [place].  Please contact his office to have him let us know when switch has been made. Please let me know when OKd by neurologist.

## 2018-02-05 NOTE — TELEPHONE ENCOUNTER
I spoke with Dr Dobson office today as per January 10th pts anti- seizure medications are as follows Keppra 500mg BID and Dilantin 300mg QD, pt to have Keppra trough drawn soon and once Keppra is theraputic Dialntin will be decreased by 100mg weekly per Dr Dobson note on this. Also noted this is noted for the pt to begin on Hep C therapy in their chart notes. They did not specify when they would do the trough for the keppra meds and when they will begin decreasing her dilantin, pt was made aware of this also from January 10th. Note sent to Dr Oritz today.

## 2018-02-07 ENCOUNTER — EPISODE CHANGES (OUTPATIENT)
Dept: HEPATOLOGY | Facility: CLINIC | Age: 53
End: 2018-02-07

## 2018-02-21 ENCOUNTER — TELEPHONE (OUTPATIENT)
Dept: HEPATOLOGY | Facility: CLINIC | Age: 53
End: 2018-02-21

## 2018-02-21 NOTE — TELEPHONE ENCOUNTER
----- Message from Ketty Avendano RN sent at 2/21/2018  2:14 PM CST -----  Contact: Pt  FYI  Dr. Ortiz's   ----- Message -----  From: Queenie Salazar  Sent: 2/19/2018   2:44 PM  To: Hepatology Scheduling    Pt calling to schedule an apt with Dr. Ortiz.       Call back number: [phone]

## 2018-02-21 NOTE — TELEPHONE ENCOUNTER
Attempt made to reach patient.  LVM that appt with Dr. Ortiz scheduled 3/22 and that she should have blood drawn 1 wk before visit.  Appt notice and lab order mailed to patient.

## 2018-02-27 ENCOUNTER — TELEPHONE (OUTPATIENT)
Dept: HEPATOLOGY | Facility: CLINIC | Age: 53
End: 2018-02-27

## 2018-02-27 NOTE — TELEPHONE ENCOUNTER
----- Message from Darlene Alvarez sent at 2/26/2018 10:54 AM CST -----  Contact: PT  Would like a call regarding scheduling her appt.     Call

## 2018-02-27 NOTE — TELEPHONE ENCOUNTER
MA called patient inform her that she is already schedule for follow up visit on 3/22/18 mailed appt reminder to patient.CLOVER

## 2018-03-22 ENCOUNTER — TELEPHONE (OUTPATIENT)
Dept: HEPATOLOGY | Facility: CLINIC | Age: 53
End: 2018-03-22

## 2018-03-22 ENCOUNTER — EPISODE CHANGES (OUTPATIENT)
Dept: HEPATOLOGY | Facility: CLINIC | Age: 53
End: 2018-03-22

## 2018-03-22 ENCOUNTER — OFFICE VISIT (OUTPATIENT)
Dept: HEPATOLOGY | Facility: CLINIC | Age: 53
End: 2018-03-22
Payer: MEDICAID

## 2018-03-22 VITALS
HEIGHT: 64 IN | DIASTOLIC BLOOD PRESSURE: 86 MMHG | BODY MASS INDEX: 22.02 KG/M2 | SYSTOLIC BLOOD PRESSURE: 145 MMHG | HEART RATE: 65 BPM | WEIGHT: 129 LBS

## 2018-03-22 DIAGNOSIS — K74.69 OTHER CIRRHOSIS OF LIVER: Primary | ICD-10-CM

## 2018-03-22 DIAGNOSIS — B18.2 HEP C W/O COMA, CHRONIC: ICD-10-CM

## 2018-03-22 PROCEDURE — 99214 OFFICE O/P EST MOD 30 MIN: CPT | Mod: S$GLB,,, | Performed by: INTERNAL MEDICINE

## 2018-03-22 NOTE — Clinical Note
Change earlier order to Ultrasound of abdomen and AFP every 6 months, next due in June 2018 (from Oct 2018)

## 2018-03-22 NOTE — TELEPHONE ENCOUNTER
Lab order mailed to patient dated to have cbc, cmp, inr 6/21 and 9/20.  Also order mailed for afp and abd US dated 10/18.  Recall entered for 5/2018 for return to clinic.  Message from provider mailed to patient.

## 2018-03-22 NOTE — Clinical Note
Recommendations: -  Patient has been switched from dilanti, phenobarb to keppra, waiting for3-month monitoring for seizure activity, so far none.  sees her neurologist, Dr. Oppenheimer.   -  Hep C treatment will be started when cleared by neurologist.  -  If she has bone mets, she due to see oncologist at [facility] on march 29, 2018. May not treat hep C.  -  Low salt in the diet, avoid canned, bottled and processed foods. -  Continue current meds -  CBC, CMP, PT INR today, followed by every 3 months -  Ultrasound of abdomen and AFP every 6 months, next due in Oct  2018 -  Avoid alcohol, smoking, sedatives and meds with codeine. -  No driving -  Avoid high intake of Tylenol (more than 4 extra-strength pills in one day) -  Call us if any bleeding, fevers, confusion, disorientation occur -  Endoscopy: Per local GI physician -  Transplant option discussed, will evaluate if masses seen in the liver or when MELD >15. -  Return in 6-8 weeks.

## 2018-03-22 NOTE — PROGRESS NOTES
[facility] Follow-up Note    Reason for Consult:  The primary encounter diagnosis was Other cirrhosis of liver. A diagnosis of Hep C w/o coma, chronic was also pertinent to this visit.    PCP: Primary Doctor No       Interval history:  Hep C treatment is deferred until patient can be switched, under the care of neurologist, Dr. Oppenheimer) from Dilantin and phenobarb to Keppra.  She has made the switch, but he is observing her for 3 months to make sure she has no seizures. Last imaging was abd MRI on 12/4/17, did not show any mass in the liver.       Kasey also had MRI of spine from her PCP, Dr. Carroll's office, which was questionably positive for malignancy involving spine, and she is referred to [facility] [place] [place].    Patient had a fall, while stepping off the curb accidentally, not paying attn. And fractured right elbow, is currently in a brace.     Denies swelling in feet, abd, jaundice, itching, confusion/disorientation, memory problems.       HPI:  This is a 52 y.o. female here for evaluation of: hep C.    Patient with cirrhosis 2/2 ETOH abuse and Hepatitis C, rheumatoid arthritis, HTN, and seizure disorder on phenytoin, is referred here for hep C treatment.      Offers no new symptoms, continues to have chr back pain.    Her hepatitis C was diagnosed approximately 2 years ago, she remains treatment naïve, she also has a history of alcohol in the past, her genotype is 1A, viral load was 5.1 million international units per mL in 2016, ultrasound of the abdomen from June 26, 2017 showed a nodular liver, borderline enlarged spleen, no mass was mentioned in an ultrasound.      Her labs on June 26, 2017 are significant for AST 74, ALT 47, total bilirubin 1.1, alpha-fetoprotein 12.7, INR 1.2, creatinine normal, GFR greater than 60.  Her hepatitis surface antibody is positive, hepatitis B antibody total was positive, HIV was negative, hemoglobin A1c was 4.7, rheumatoid arthritis  factor was positive.    On October 8/9, 2017, patient went to the local emergency room at [facility] in [place], [place], for severe right-sided abdominal pain which originated in the low back and radiated towards the right flank and to the right side of the abdomen. A CT scan performed without contrast for a renal stone study showed a possible mass in the left lobe of the liver, due to the lack of contrast could not be confirmed.  Also seen were findings consistent with cirrhosis and mild enlargement of the spleen.    Interval History:  Dr Carroll , patient's PCP Phone [phone], called [facility] clinic nurse, he reported neurologist Dr. Jose A Huff (phone [phone]) had ordered MRI wo contrast, which was done on 10/18/17, it showed diffuse patchy intermediate T1, T2 signal intensity throughout bone marrow which is concerning for metastatic bone marrow disease.  Splenomegaly. Disc bulge L4-L5.      I had requested MRI w/wo contrast of abd, due to indeterminate lesion in the liver, that is still pending. If mets exist, she will not be a candidate for liver transplant.     [place] [facility] [place]16:  Transferred from [facility] for evaluation of BRBPR. Patient was admitted to the ICU there after having 3 seizures at home and found to be in septic shock 2/2 E coli bacteremia. Patient subsequently developed DIC, which improved while she was treated for pneumonia with Vanc and Zosyn. On hospital admission Day#5, patient presented with abdominal distention and nausea and imaging showing high-grade small bowel obstruction. After failing conservative management, she underwent an ex-lap with SB partial resection and lysis of adhesion on 8/9. Postop course was complicated by worsening DIC requiring multiple transfusions of pRBC, FFP, and cryo. Patient also developed maroon-colored stools, and was managed with octretide and protonix gtt.      Scopes  EGD 8/17 -  esophageal varices but no active bleeding, no report of bands being placed   Colonoscopy 8/20 - poor prep, there was evidence of old blood but no active bleeding     Course of Principle Problem for Admission:   Patient had repeat endoscopy performed on admission here to [facility] due to recurrent hematochezia with anemia requiring transfusion. EGD showed evidence of grade i esophageal varices, portal hypertensive gastropathy, normal duodenum. Colonoscopy showed evidence of poor prep, with blood in the entire examined colon with no bleeding site found, thought to be small bowel in origin with most likely source being the anastomosis. Hg remained stable since that time with no recurrent episodes of hematochezia, as such patient will be transferred back to [facility] for continued care from her surgeons who performed recent bowel resection. Coagulopathy managed while inpatient with PRN transfusions of cryoprecipitate and FFP to maintain INR<1.5.     Other Medical Problems Addressed in the Hospital:   Patient also completed treatment of E Coli sepsis, and will transferred on PO Cipro daily for SBP prophylaxis despite no evidence of ascites amenable to paracentesis.        Pertinent/Significant Diagnostic Studies:    1. EGD/Colonoscopy: detailed above  2. CT Abdomen: Postsurgical bowel changes, noting mildly prominent small bowel loops in the midabdomen, increased in caliber as compared to the previous examination. This may reflect reactive ileus in this patient with suspected intra-abdominal hematoma and ascites, differential would include slow flow through the anastomotic site or possibly partial small bowel obstruction on the basis of adhesions although no sharp transition point seen. No free air or extraluminal contrast extravasation appreciated.      Elevated liver enzymes: Yes  Abnormal imaging: Yes  Cirrhosis: Yes  Hepatitis C: Yes  Hepatitis B: No  Fatty liver: Yes  Encephalopathy:  No  Post-hospital discharge: No  Symptoms: none    Primary hepatic manifestations:  Fatigue:No  Edema:No  Ascites:No  Encephalopathy:No  Abdominal pain:No  GI bleeds: Yes  Pruritus:No  Weight Changes:No  Changes in Bowel habits: No  Muscle cramps:No    Risk factors for liver disease:  No jaundice  received transfusions in 2016 for UGI bleed  No IVDU  Did not snort cocaine or similar agents  Did not live with anyone with hepatitis B or C  Sexual partner not tested  No hepatotoxic medications  No exposure to industrial toxins  Alcohol: used to drink, stopped 6 months ago, approx 4/2017      ROS:  Constitutional: No fevers, chills, weight changes, fatigue  ENT: No allergies, nosebleeds,   CV: No chest pain  Pulm: No cough, shortness of breath  Ophtho: No vision changes  GI/Liver: see HPI  Derm: No rash, itching  Heme: No swollen glands, bruising  MSK: No joint pains, joint swelling  : No dysuria, hematuria, decrease in urine output  Endo: No hot or cold intolerance  Neuro: No confusion, disorientation, difficulty with sleep, memory, concentration, syncope, seizure  Psych: No anxiety, depression    Medical History:  has a past medical history of Arthritis; Chronic pain; Cirrhosis of liver; Hepatitis; Hypertension; and Seizures.    Surgical History:  has a past surgical history that includes Hysterectomy; Hernia repair; Cholecystectomy; Abdominal surgery; and Colonoscopy (N/A, 8/25/2016).    Family History: family history is not on file..     Social History:  reports that she has quit smoking. She does not have any smokeless tobacco history on file. She reports that she drinks alcohol. She reports that she does not use drugs.    Review of patient's allergies indicates:  No Known Allergies    Current Outpatient Prescriptions   Medication Sig    levetiracetam (KEPPRA) 500 MG Tab Take 500 mg by mouth once daily.    megestrol (MEGACE) 400 mg/10 mL (40 mg/mL) Susp Take 800 mg by mouth once daily.    ondansetron HCl, PF,  4 mg/2 mL Soln Inject 8 mg into the vein every 8 (eight) hours as needed.    pantoprazole (PROTONIX) 40 MG tablet Take 1 tablet (40 mg total) by mouth once daily.    phenytoin (DILANTIN) 100 MG ER capsule Take 100 mg by mouth 3 (three) times daily.     propranolol (INDERAL) 20 MG tablet Take 1 tablet (20 mg total) by mouth 2 (two) times daily.    ramelteon (ROZEREM) 8 mg tablet Take 1 tablet (8 mg total) by mouth nightly as needed for Insomnia.    senna-docusate 8.6-50 mg (PERICOLACE) 8.6-50 mg per tablet Take 1 tablet by mouth once daily.     No current facility-administered medications for this visit.        Objective Findings:    Vital Signs:  LMP  (LMP Unknown)   There is no height or weight on file to calculate BMI.    Physical Exam:  General Appearance: Well appearing in no acute distress  Head:   Normocephalic, without obvious abnormality  Eyes:    No scleral icterus, EOMI  ENT: Neck supple, Lips, mucosa, and tongue normal; teeth and gums normal  Lungs: CTA bilaterally in anterior and posterior fields, no wheezes, no crackles.  Heart:  Regular rate and rhythm, S1, S2 normal, no murmurs heard  Abdomen: Soft, non tender, non distended with positive bowel sounds in all four quadrants. No hepatosplenomegaly, ascites, or mass  Extremities: 2+ pulses, no clubbing, cyanosis or edema  Skin: No rash  Neurologic: CN II-XII intact, alert, oriented x 3. No asterixis      Labs:  Lab Results   Component Value Date    WBC 4.8 12/07/2017    HGB 12.8 12/07/2017    HCT 36 12/07/2017     (A) 12/07/2017    INR 1.1 10/12/2017    CREATININE 0.9 12/07/2017    BUN 10 12/07/2017    BILITOT 2.3 (H) 08/30/2016    BILITOT 2.3 (H) 08/30/2016    ALT 41 12/07/2017    AST 59 12/07/2017    ALKPHOS 55 08/30/2016    ALKPHOS 55 08/30/2016     12/07/2017    K 3.6 12/07/2017    CL 89 12/07/2017    CO2 25 12/07/2017    TSH 2.761 08/20/2016    AFP 3.8 08/21/2016       Imaging:       Endoscopy:      Assessment:  1. Other cirrhosis  of liver    2. Hep C w/o coma, chronic         Hepatitis C genotype 1A, treatment naïve, needs to be treated after confirming the status of the liver mass that was seen on a CT scan on October 8/9 2017, and follow-up MRI on Dec 4, 2017.  If she has confirmed HCC, would wait until after potential transplant for treatment for hep C will be started.  If, on the other hand, there is no mass found in the liver, then we will proceed with Hep C treatment.  However, she needs to be off the phenytoin and switch to another anti-seizure medication which will not interact with Hep C medications.      CT reviewed in IR conf on 11/7/17:  Plan: CT from 10/27/17 shows a vague 3.6 cm area of hyperattenuation in the let lobe on non contrast portion with ? Washout and possible subtle enhancement.  Hounsfield units are a little higher that the liver parenchyma.  This is indeterminate. Rec MRI now. it was finally done on 12/4/17, and no enhancing mass was seen on MRI.       In order to make the switch of anti-seizure medications, she saw a neurologist, Dr Huff, who obtained and mRI of spine because of chronic back pain.. Patient unaware of any positive mRI spine findings but her PCP, Dr Carroll ([phone]) called our [facility] nurse and reported that neurologist Dr. Jose A Huff (phone [phone]) had ordered MRI wo contrast, which was done on 10/18/17, it showed diffuse patchy intermediate T1, T2 signal intensity throughout bone marrow which is concerning for metastatic bone marrow disease.  Splenomegaly. Disc bulge L4-L5.       Recommendations:  -  Patient has been switched from dilantin, phenobarb to keppra, waiting for3-month monitoring for seizure activity, so far none.  sees her neurologist, Dr. Oppenheimer.    -  Hep C treatment will be started when cleared by neurologist.   -  If she has bone mets, she due to see oncologist at [facility] on march 29, 2018. May not treat hep C.   -  Low salt in the diet, avoid  canned, bottled and processed foods.  -  Continue current meds  -  CBC, CMP, PT INR today, followed by every 3 months  -  Ultrasound of abdomen and AFP every 6 months, next due in June 2018  -  Avoid alcohol, smoking, sedatives and meds with codeine.  -  No driving  -  Avoid high intake of Tylenol (more than 4 extra-strength pills in one day)  -  Call us if any bleeding, fevers, confusion, disorientation occur  -  Endoscopy: Per local GI physician  -  Transplant option discussed, will evaluate if masses seen in the liver or when MELD >15.  -  Return in 6-8 weeks.          No Follow-up on file.      Order summary:  No orders of the defined types were placed in this encounter.      Thank you so much for allowing me to participate in the care of Gagan Ortiz MD

## 2018-03-22 NOTE — TELEPHONE ENCOUNTER
----- Message from Tiffanie Ortiz MD sent at 3/22/2018 11:31 AM CDT -----  Recommendations:  -  Patient has been switched from dilanti, phenobarb to keppra, waiting for3-month monitoring for seizure activity, so far none.  sees her neurologist, Dr. Oppenheimer.    -  Hep C treatment will be started when cleared by neurologist.   -  If she has bone mets, she due to see oncologist at [facility] on march 29, 2018. May not treat hep C.   -  Low salt in the diet, avoid canned, bottled and processed foods.  -  Continue current meds  -  CBC, CMP, PT INR today, followed by every 3 months  -  Ultrasound of abdomen and AFP every 6 months, next due in Oct  2018  -  Avoid alcohol, smoking, sedatives and meds with codeine.  -  No driving  -  Avoid high intake of Tylenol (more than 4 extra-strength pills in one day)  -  Call us if any bleeding, fevers, confusion, disorientation occur  -  Endoscopy: Per local GI physician  -  Transplant option discussed, will evaluate if masses seen in the liver or when MELD >15.  -  Return in 6-8 weeks.

## 2018-03-22 NOTE — PATIENT INSTRUCTIONS
Recommendations:  -  Patient has been switched from dilanti, phenobarb to keppra, waiting for3-month monitoring for seizure activity, so far none.  sees her neurologist, Dr. Oppenheimer.    -  Hep C treatment will be started when cleared by neurologist.   -  If she has bone mets, she due to see oncologist at [facility] on march 29, 2018. May not treat hep C.   -  Low salt in the diet, avoid canned, bottled and processed foods.  -  Continue current meds  -  CBC, CMP, PT INR today, followed by every 3 months  -  Ultrasound of abdomen and AFP every 6 months, next due in June 2018  -  Avoid alcohol, smoking, sedatives and meds with codeine.  -  No driving  -  Avoid high intake of Tylenol (more than 4 extra-strength pills in one day)  -  Call us if any bleeding, fevers, confusion, disorientation occur  -  Endoscopy: Per local GI physician  -  Transplant option discussed, will evaluate if masses seen in the liver or when MELD >15.  -  Return in 6-8 weeks.

## 2018-03-23 LAB
ALBUMIN: 3.7
ALP ISOS SERPL LEV INH-CCNC: 100 U/L
ALT SERPL-CCNC: 36 U/L
ANION GAP SERPL CALC-SCNC: 12 MMOL/L
AST SERPL-CCNC: 55 U/L
BASOPHILS ABSOLUTE COUNT: 0 /ΜL
BASOPHILS NFR BLD: 1 % (ref 0–3)
BILIRUBIN, TOTAL: 1.7
BUN BLD-MCNC: 14 MG/DL (ref 4–21)
CALCIUM SERPL-MCNC: 9.5 MG/DL
CARBON DIOXIDE, CO2: 24
CHLORIDE: 99
CREAT SERPL-MCNC: 1.1 MG/DL
EGFR IF AFRICAN AMERICAN: >60
EOSINOPHIL NFR BLD: 1.6 %
EOSINOPHILS ABSOLUTE COUNT: 0 /ΜL
ERYTHROCYTE [DISTWIDTH] IN BLOOD BY AUTOMATED COUNT: 42.1 %
EST. GFR  (NON AFRICAN AMERICAN): 54 MG/DL
GFR MDRD AF AMER: 65
GFR MDRD NON AF AMER: 54
GLUCOSE: 64
HCT VFR BLD AUTO: 41 % (ref 36–46)
HGB BLD-MCNC: 14.2 G/DL (ref 12–16)
IMMATURE GRANS (ABS): 0.01
IMMATURE GRANULOCYTES: 0.2
INR PPP: 1.1
LYMPH%: 51 % (ref 18–52)
LYMPHOCYTES ABSOLUTE COUNT: 3 /?L
MCH RBC QN AUTO: 31.8 PG
MCHC RBC AUTO-ENTMCNC: 34.3 G/DL
MCV RBC AUTO: 92.8 FL
MONO%: 8 % (ref 3–10)
MONOCYTES ABSOLUTE COUNT: 0.38
NEUTROPHILS - MAN (DIFF): 39
NEUTROPHILS ABSOLUTE COUNT: 2 /ΜL
NRBC: 0
NUCLEATED RBC-MANUAL: 0
PLATELETS: 141
POTASSIUM: 3
PROT SERPL-MCNC: 10.3 G/DL
PT: 12.9
RBC # BLD AUTO: 4.46 10*6/UL
SODIUM: 135
WBC: 4.93

## 2018-03-26 ENCOUNTER — TELEPHONE (OUTPATIENT)
Dept: HEPATOLOGY | Facility: CLINIC | Age: 53
End: 2018-03-26

## 2018-03-26 NOTE — TELEPHONE ENCOUNTER
Pt informed of these results this morning she will await other clearance and hear back from Dr Ortiz.

## 2018-03-26 NOTE — TELEPHONE ENCOUNTER
----- Message from Tiffanie Ortiz MD sent at 3/24/2018  5:26 PM CDT -----  Pl inform pt:  Labs obtained in [facility] last Thursday are ok. Waiting to start hep C treatment until we get clearance from Dr Oppenheimer regarding seizure meds (switch from dilantin and phenobarb to Keppra), and to see what oncologist has to say about MRI spine findings of possible malignancy in the vertebrae.

## 2018-03-27 ENCOUNTER — EPISODE CHANGES (OUTPATIENT)
Dept: HEPATOLOGY | Facility: CLINIC | Age: 53
End: 2018-03-27

## 2018-03-28 LAB — AFP-TUMOR MARKER: 18

## 2018-03-29 ENCOUNTER — TELEPHONE (OUTPATIENT)
Dept: HEPATOLOGY | Facility: CLINIC | Age: 53
End: 2018-03-29

## 2018-03-29 NOTE — TELEPHONE ENCOUNTER
----- Message from Tiffanie Ortiz MD sent at 3/29/2018  5:39 AM CDT -----  Pl inform pt:   Tumor marker (AFP) is mildly elevated (18), could be due to hep C  Need to know what the Oncologist says today 3/29/18 about her abnormal reading of the spine on MRI.

## 2018-03-29 NOTE — TELEPHONE ENCOUNTER
Pt informed of this today she will let Dr Ortiz know what her oncologist says about her MRI reading of her spine after her visit today.

## 2018-04-18 NOTE — TELEPHONE ENCOUNTER
Pl move u/s to be done now rather than wait till Feb, we may have to get biopsy of the mass.    18-Apr-2018 11:09

## 2018-05-07 ENCOUNTER — TELEPHONE (OUTPATIENT)
Dept: HEPATOLOGY | Facility: CLINIC | Age: 53
End: 2018-05-07

## 2018-05-07 NOTE — TELEPHONE ENCOUNTER
----- Message from Dafne Hamilton MA sent at 5/3/2018 10:04 AM CDT -----  Contact: pt  Desiree can you help this pt she is for NYDIA PAULINE with Dr Angel carrillo.  ----- Message -----  From: Rhoda Huber  Sent: 5/3/2018   9:28 AM  To: [facility] Hepatitis C Staff    Patient Requesting Sooner Appointment.     Reason: (I.e. Caller declined first available, appointment listed below. Caller will not accept being placed on the waitlist and is requesting a message be sent to doctor, etc.)    Name of Caller: pt  When is the first available appointment?  Symptoms:  Communication Preference (MyChart response to Pt. (or) Call Back # and timeframe): [phone]  Additional Information: calling to sched with Hep C

## 2018-05-08 ENCOUNTER — TELEPHONE (OUTPATIENT)
Dept: HEPATOLOGY | Facility: CLINIC | Age: 53
End: 2018-05-08

## 2018-05-08 NOTE — TELEPHONE ENCOUNTER
----- Message from Dave Stewart sent at 5/8/2018  9:05 AM CDT -----  Contact: patient    Appointment Request    Caller is requesting a same day appointment. Caller declined first available appointment listed below.    Was an appointment with another provider offered?   no  Name of Caller:patient   When is the first available appointment? Any available date and time  Symptoms:n/a  Communication Preference (MyChart response to Pt. (or) Call Back # and timeframe):  Please call [phone]  Additional Information:patient calling to schedule an follow up/lab appointment

## 2018-05-08 NOTE — TELEPHONE ENCOUNTER
MA called patient back schedule her follow up visit. She is unable to reached left her Vm to please give us a callback.

## 2018-05-11 ENCOUNTER — EPISODE CHANGES (OUTPATIENT)
Dept: HEPATOLOGY | Facility: CLINIC | Age: 53
End: 2018-05-11

## 2018-05-24 ENCOUNTER — EPISODE CHANGES (OUTPATIENT)
Dept: HEPATOLOGY | Facility: CLINIC | Age: 53
End: 2018-05-24

## 2018-05-29 ENCOUNTER — EPISODE CHANGES (OUTPATIENT)
Dept: HEPATOLOGY | Facility: CLINIC | Age: 53
End: 2018-05-29

## 2018-05-31 ENCOUNTER — TELEPHONE (OUTPATIENT)
Dept: HEPATOLOGY | Facility: CLINIC | Age: 53
End: 2018-05-31

## 2018-05-31 NOTE — TELEPHONE ENCOUNTER
Pt is aware Dr Ortiz is working on her getting started on her meds then we wikll call her to schedule her follow up appt after her meds are started.

## 2018-05-31 NOTE — TELEPHONE ENCOUNTER
----- Message from Rhoda Huber sent at 5/31/2018 10:01 AM CDT -----  Contact: pt  Patient Requesting Sooner Appointment.     Reason for sooner appt.:  When is the first available appointment?  Communication Preference: [phone]  Additional Information: needs f/u appt scheduled

## 2018-06-19 ENCOUNTER — EPISODE CHANGES (OUTPATIENT)
Dept: HEPATOLOGY | Facility: CLINIC | Age: 53
End: 2018-06-19

## 2018-06-20 ENCOUNTER — EPISODE CHANGES (OUTPATIENT)
Dept: HEPATOLOGY | Facility: CLINIC | Age: 53
End: 2018-06-20

## 2018-06-20 ENCOUNTER — TELEPHONE (OUTPATIENT)
Dept: HEPATOLOGY | Facility: CLINIC | Age: 53
End: 2018-06-20

## 2018-06-20 NOTE — TELEPHONE ENCOUNTER
Pt states that she couldn't walk and had to call ambulance for her to pick her up this past Sunday because her body locked up she thought it was from low potassium however she said everything looked good with that, she said the doctor noticed she had liver disease and told her to contact you back to see when she would be started on her medication for Hep C this could be causing her to have these issues, please advise on this. She also said Dr Oppenheimer sent over release for her to start her meds back on end of last month, when shes been off dilantin and started keppra and ok to start Hep C meds shes waiting to do that, please advise thanks.

## 2018-06-20 NOTE — TELEPHONE ENCOUNTER
----- Message from Torie Tracey sent at 6/20/2018  9:08 AM CDT -----  Contact: pt  Needs Advice    Reason for call:    Pt called for advising on medication. She stated she had to go to the ER because she couldn't move.   Communication Preference: [phone].  Additional Information: n/a

## 2018-06-25 ENCOUNTER — EPISODE CHANGES (OUTPATIENT)
Dept: HEPATOLOGY | Facility: CLINIC | Age: 53
End: 2018-06-25

## 2018-07-03 ENCOUNTER — TELEPHONE (OUTPATIENT)
Dept: HEPATOLOGY | Facility: CLINIC | Age: 53
End: 2018-07-03

## 2018-07-03 ENCOUNTER — TELEPHONE (OUTPATIENT)
Dept: PHARMACY | Facility: CLINIC | Age: 53
End: 2018-07-03

## 2018-07-03 DIAGNOSIS — B18.2 HEP C W/O COMA, CHRONIC: Primary | ICD-10-CM

## 2018-07-03 LAB
ALBUMIN/GLOB SERPL ELPH: 0.58 {RATIO}
BUN/CREAT SERPL: 10.1
EXT ALBUMIN: 3.3
EXT ALKALINE PHOSPHATASE: 111
EXT ALT: 58
EXT AST: 90
EXT BASOPHIL%: 0.2
EXT BILIRUBIN TOTAL: 0.8
EXT BUN: 10
EXT CALCIUM: 8.9
EXT CHLORIDE: 103
EXT CO2: 25
EXT CREATININE: 0.99 MG/DL
EXT EOSINOPHIL%: 1.7
EXT GLUCOSE: 81
EXT HEMATOCRIT: 40.6
EXT HEMOGLOBIN: 14.1
EXT LYMPH%: 54.8
EXT MCH: 32.3
EXT MCHC: 34.7
EXT MCV: 92.9
EXT MONOCYTES%: 8.9
EXT NEUT%: 34.4
EXT NEUTROPHILS (ABSOLUTE): 1.4
EXT PLATELETS: 89
EXT POTASSIUM: 4.2
EXT PROTEIN TOTAL: 9
EXT RBC: 4.37
EXT RDW: 13.5
EXT SODIUM: 137 MMOL/L
EXT WBC: 4.2
GFR: >60
GLOBULIN: 5.7

## 2018-07-03 NOTE — TELEPHONE ENCOUNTER
Dr. Ortiz ordered that patient have labs drawn on 6/21/18.  She did not complete draw as ordered.  I spoke with her on 6/26/18 and she said that she would have draw done ASAP.  I spoke with lab today and patient was a no-show.  Patient did go to ER on 6/19/18 and labs were drawn.  No pt/inr collected. CBC and CMP results requested and entered into Epic.

## 2018-07-03 NOTE — TELEPHONE ENCOUNTER
Hep C, cirrhosis, brooke 1a, 125,100 IU/mL in August 2016. AFP increased from 12.7 in June 2017  to 43 in Oct 2017.      CT renal stone study on Oct 8/9 2017 showed:  a possible mass in the left lobe of the liver, due to the lack of contrast could not be confirmed.   MRI abd w/wo contrast 12/4/17: no discrete hyperenhancing lesion in the liver.     Mavyret x 12 weeks planned for hep C treatment.    Patient needed to see neurologist first to see if she can be switched form dilantin to another anti-seizure med.    AFP 18 on 3/22/18.    Dr. Oppenheimer switched patient to Keppra and cleared her for hep C treatment as she is now on free of seizures on Keppra.      Mavyret x 12 weeks escripted to [facility] on 7/3/18.

## 2018-07-05 ENCOUNTER — EPISODE CHANGES (OUTPATIENT)
Dept: HEPATOLOGY | Facility: CLINIC | Age: 53
End: 2018-07-05

## 2018-07-06 ENCOUNTER — TELEPHONE (OUTPATIENT)
Dept: HEPATOLOGY | Facility: CLINIC | Age: 53
End: 2018-07-06

## 2018-07-06 LAB
ALBUMIN/GLOB SERPL ELPH: 0.7 {RATIO}
BUN/CREAT SERPL: 8
EXT ALBUMIN: 3.6
EXT ALKALINE PHOSPHATASE: 93
EXT ALT: 60
EXT ANION GAP: 13
EXT AST: 78
EXT BASOPHIL%: 0.5
EXT BASOPHILS (ABSOLUTE): 0.02
EXT BILIRUBIN TOTAL: 1.6
EXT BUN: 7
EXT CALCIUM: 9.1
EXT CHLORIDE: 98
EXT CO2: 28
EXT CREATININE: 0.87 MG/DL
EXT EOSINOPHIL%: 1.6
EXT EOSINOPHILS (ABSOLUTE): 0.06
EXT GLUCOSE: 89
EXT HEMATOCRIT: 40.6
EXT HEMOGLOBIN: 13.8
EXT LYMPH%: 49.1
EXT LYMPHS (ABSOLUTE): 1.83
EXT MCH: 32.3
EXT MCHC: 34
EXT MCV: 95.1
EXT MONOCYTES (ABSOLUTE): 0.34
EXT MONOCYTES%: 9.1
EXT NEUT%: 39.4
EXT NEUTROPHILS (ABSOLUTE): 1.47
EXT PLATELETS: 121
EXT POTASSIUM: 3.6
EXT PROTEIN TOTAL: 8.8
EXT RBC: 4.27
EXT RDW: 44.2
EXT SODIUM: 139 MMOL/L
EXT WBC: 3.73
GFR: 68.11
GLOBULIN: 5.2
IMMATURE GRANS (ABS): 0.01
IMMATURE GRANULOCYTES: 0.3
NRBC: 0
NUCLEATED RED BLOOD CELLS: 0 /100 (ref 0–2)

## 2018-07-06 NOTE — TELEPHONE ENCOUNTER
INR result from [facility] pending.  Once receive result I will send a request for patient to have hcv rna done ASAP.

## 2018-07-09 ENCOUNTER — EPISODE CHANGES (OUTPATIENT)
Dept: HEPATOLOGY | Facility: CLINIC | Age: 53
End: 2018-07-09

## 2018-07-09 LAB
INR PPP: 1.3 (ref 2–3)
PT: 13.1

## 2018-07-10 ENCOUNTER — TELEPHONE (OUTPATIENT)
Dept: HEPATOLOGY | Facility: CLINIC | Age: 53
End: 2018-07-10

## 2018-07-10 NOTE — TELEPHONE ENCOUNTER
----- Message from Tiffanie Ortiz MD sent at 7/10/2018  3:08 PM CDT -----  Please inform patient results are OK.

## 2018-07-11 ENCOUNTER — TELEPHONE (OUTPATIENT)
Dept: HEPATOLOGY | Facility: CLINIC | Age: 53
End: 2018-07-11

## 2018-07-11 NOTE — TELEPHONE ENCOUNTER
Patient needs to have hcv quant done locally ASAP per [facility].  I spoke with patient.  Order faxed to [facility] and mailed to patient dated 7/12/18.

## 2018-07-16 ENCOUNTER — EPISODE CHANGES (OUTPATIENT)
Dept: HEPATOLOGY | Facility: CLINIC | Age: 53
End: 2018-07-16

## 2018-07-19 ENCOUNTER — TELEPHONE (OUTPATIENT)
Dept: TRANSPLANT | Facility: CLINIC | Age: 53
End: 2018-07-19

## 2018-07-19 ENCOUNTER — EPISODE CHANGES (OUTPATIENT)
Dept: HEPATOLOGY | Facility: CLINIC | Age: 53
End: 2018-07-19

## 2018-07-19 ENCOUNTER — TELEPHONE (OUTPATIENT)
Dept: HEPATOLOGY | Facility: CLINIC | Age: 53
End: 2018-07-19

## 2018-07-19 LAB
HCV LOG10: 6.78
HCV RNA QUANT PCR LOG: NORMAL

## 2018-07-19 NOTE — TELEPHONE ENCOUNTER
Patient LVM requesting a return call.  Attempt made to reach her unsuccessful. LVM asking that she call back and I informed her that HCV Quant result was received.

## 2018-07-20 ENCOUNTER — EPISODE CHANGES (OUTPATIENT)
Dept: HEPATOLOGY | Facility: CLINIC | Age: 53
End: 2018-07-20

## 2018-07-23 ENCOUNTER — EPISODE CHANGES (OUTPATIENT)
Dept: HEPATOLOGY | Facility: CLINIC | Age: 53
End: 2018-07-23

## 2018-07-24 ENCOUNTER — TELEPHONE (OUTPATIENT)
Dept: PHARMACY | Facility: CLINIC | Age: 53
End: 2018-07-24

## 2018-07-24 NOTE — TELEPHONE ENCOUNTER
Initial Mavyret consult completed on . Mavyret will be shipped on  to arrive at patient's home on  via [employer]. Patient will start Mavyret on . Address confirmed, CC on file. Confirmed 2 patient identifiers - name and . Therapy Appropriate.     Mavyret 100/40mg- Take three tablets by mouth once daily WITH FOOD x 12 weeks  Counseling was reviewed:   1. Patient MUST take Mavret at the SAME time every day.   2. Potential Side effects include: nausea, headaches, diarrhea and fatigue.   Headache: Patient may treat with OTC remedies. If Tylenol is used, dose should not exceed 2000mg per day.    4. Medication list reviewed. No DDIs or allergies noted. Patient MUST contact myself or provider prior to starting any new OTC, herbal, or prescription drugs to avoid potential DDIs.    DDI: Medication list reviewed and potential DDIs addressed.    Discussed the importance of staying well hydrated while on therapy. Compliance stressed - patient to take missed doses as soon as remembered, but NOT to take 2 doses in one day. Patient will report questions or concerns to myself or practitioner. Patient verbalizes understanding. Patient plans to start Mavyret on .  Consultation included: indication; goals of treatment; administration; storage and handling; side effects; how to handle side effects; the importance of compliance; how to handle missed doses; the importance of laboratory monitoring; the importance of keeping all follow up appointments.  Patient understands to report any medication changes to [facility] and provider. All questions answered and addressed to patients satisfaction. F/U will occur 7-10 days from start of treatment, [facility] to contact patient in 3 weeks for refills.

## 2018-07-24 NOTE — TELEPHONE ENCOUNTER
DOCUMENTATION ONLY:  Prior authorization for Mavyret approved from 07/24/2018 to 10/24/2018 x 12 weeks of treatment.   Case ID# [id number]    Co-pay: $3.00    Patient Assistance IS NOT required.     Forward to clinical pharmacist for consult & shipment.

## 2018-07-31 ENCOUNTER — EPISODE CHANGES (OUTPATIENT)
Dept: HEPATOLOGY | Facility: CLINIC | Age: 53
End: 2018-07-31

## 2018-07-31 ENCOUNTER — TELEPHONE (OUTPATIENT)
Dept: HEPATOLOGY | Facility: CLINIC | Age: 53
End: 2018-07-31

## 2018-07-31 DIAGNOSIS — K74.69 OTHER CIRRHOSIS OF LIVER: Primary | ICD-10-CM

## 2018-07-31 DIAGNOSIS — B18.2 HEP C W/O COMA, CHRONIC: ICD-10-CM

## 2018-07-31 NOTE — TELEPHONE ENCOUNTER
Mavyret x 12 weeks started 7/28/18    Please send following lab schedule to patient:    Labs:  7/31/18: HCC surveillance with AFP and u/s abd every 6 months, start now.   Wk 4 on 8/25 /18: CMP, HCV RNA quant ordered  Wk 8 on 9/22/18:  CMP ordered  Wk 12 on 10/20/18:  CMP, HCV RNA quant ordered  Post-rx wk 4 on 11/17/18: HCV RNA quant ordered  Post-rx wk 12 on 1/12/19: HCV RNA quant ordered  HCC surveillance with AFP and u/s abd 1/30/19, 7/30/19, and so on every 6 months.  Post-rx wk 24 on 4/6/19:  HCV RNA quant ordered.

## 2018-08-06 ENCOUNTER — TELEPHONE (OUTPATIENT)
Dept: HEPATOLOGY | Facility: CLINIC | Age: 53
End: 2018-08-06

## 2018-08-06 ENCOUNTER — TELEPHONE (OUTPATIENT)
Dept: PHARMACY | Facility: CLINIC | Age: 53
End: 2018-08-06

## 2018-08-06 NOTE — TELEPHONE ENCOUNTER
Initial clinical follow-up conducted for Mavyret.  Name/ confirmed with daughter, Ms. Green.  Confirmed patient start date of 18.  Patient confirms that she is taking Mavyret with food daily at 11am.  Patient denies skipping or missing doses.  Patient reports experiencing extreme nausea with bloody emesis. After speaking with hepatology nurse, RISA Medeiros, and patient's daughter - Patient had varices noted on EGD last week at outside hospital.  Nausea and vomiting has continued since starting Mavyret.  Instructed Ms. Green to go to ED immediately. MDO office aware and daughter advised that are symptoms likely associated with varices vs. Mavyret.  Will f/u with daughter based on MDO recommendation.  Patient reminded of lab appointments.  Patient counseled on importance of maintaining adherence and keeping lab appointments which were scheduled.

## 2018-08-06 NOTE — TELEPHONE ENCOUNTER
Received message from Mala Cabral RN: daughter called stating pt was admitted to local hospital recently w/ GI bleed and wanted to speak to Dr Ortiz.    PC placed to pt's phone ([phone])  Spoke w/ daughter Wade    Was told pt admitted to local hospital ([facility]) Thurs 8/2-Sun 8/5 b/c of vomiting blood. Sounds as if EGD was done; daughter unaware of results. Pt did require PRBC transfusion.  This AM pt vomited blood again; quantity of blood unclear. Pt taken to a different hospital ER ([facility]) where blood work was done; pt sent home w/ zofran.    Throughout my phone call w/ daughter, she was repeatedly talking to someone else about someone's hair color (while I was talking) and often didn't appear to be listening to what I was saying or asking. I attempted to find out name of pt's GI MD or name of provider who had done EGD inpt. Daughter insists pt doesn't have GI MD and only needs to see Dr Ortiz. Pt then hung up on me.    I tried to call pt back.   Left  for her to return call so I could help make sure her mom was being seen by appropriate provider.  (Ideally I would like to obtain results of recent EGD and hospital discharge summary as well as get an updated stat CBC to better direct care while Dr Ortiz is out of town)

## 2018-08-09 NOTE — TELEPHONE ENCOUNTER
Outside records reviewed:  (will be scanned into media)    8/2/18:  pt admitted to [facility] w/ vomiting blood. She was hypotensive in ER, admitted to ICU and transfused.  inpt EGD by Dr Lowe:   7 gastric ulcers, one w/ stigmata of bleeding   Reflux esophagitis  8/5/18: pt D/C home on protonix BID w/ plans for f/u in clinic w/ Dr Mims after 2-3 weeks. Hgb upon discharge 9.3.      8/6/18:  pt presented to ER w/ vomiting blood  Labs in ER (8/6/18) - Hgb 10.2  D/C home w/ instructions to f/u w/ PCP and GI MD  ______________________________________  Spoke to pt  · Reviewed above info and cause of bleeding as well as the hospital d/c recommendations that she f/u w/ PCP in a week and GI after 2-3 weeks  · Pt states she will call PCP (Dr Carroll) for f/u tomorrow and osvaldo referral to a GI MD on her insurance plan (b/c Dr Mims does not take Medicaid)  · Advised pt to report to ER if she has any more GI bleeding  · Pt verbalized understanding.

## 2018-08-17 ENCOUNTER — TELEPHONE (OUTPATIENT)
Dept: PHARMACY | Facility: CLINIC | Age: 53
End: 2018-08-17

## 2018-08-17 NOTE — TELEPHONE ENCOUNTER
Mavyret (2 of 3)  refill confirmed. We will ship Mavyret refill on  via fedex to arrive on . $3.00 copay- 004. Confirmed 2 patient identifiers - name and .     Patient has 7 doses of Mavyret remaining and takes it at the same time daily.  Pt reports nausea and bloody emesis.  Patient has not had any addition issues of bloody emesis since being discharged; however, patient still is having nausea.  Patient is still taking ondansetron but is out of refills.  Advised patient to contact PCP for additional refills.  No missed doses, no new medications, no new allergies or health conditions reported at this time.  All questions answered and addressed to patients satisfaction.  Pt verbalized understanding.

## 2018-08-17 NOTE — TELEPHONE ENCOUNTER
Attempted to contact patient for to check on patient's status regarding varices and Mavyret.  Unable to contact patient - LVM.

## 2018-08-24 ENCOUNTER — EPISODE CHANGES (OUTPATIENT)
Dept: HEPATOLOGY | Facility: CLINIC | Age: 53
End: 2018-08-24

## 2018-08-29 ENCOUNTER — EPISODE CHANGES (OUTPATIENT)
Dept: HEPATOLOGY | Facility: CLINIC | Age: 53
End: 2018-08-29

## 2018-08-30 ENCOUNTER — TELEPHONE (OUTPATIENT)
Dept: PHARMACY | Facility: CLINIC | Age: 53
End: 2018-08-30

## 2018-09-11 NOTE — TELEPHONE ENCOUNTER
Mavyret (2 of 3)  refill confirmed. We will ship Mavyret refill on  via fedex to arrive on . $3.00 copay- 004. Confirmed 2 patient identifiers - name and .     Patient has 11 doses of Mavyret remaining and takes it at the same time daily.  Pt reports they are not having any side effects so far.  No missed doses, no new medications, no new allergies or health conditions reported at this time.  All questions answered and addressed to patients satisfaction.  Pt verbalized understanding.

## 2018-09-19 ENCOUNTER — EPISODE CHANGES (OUTPATIENT)
Dept: HEPATOLOGY | Facility: CLINIC | Age: 53
End: 2018-09-19

## 2018-09-19 ENCOUNTER — TELEPHONE (OUTPATIENT)
Dept: HEPATOLOGY | Facility: CLINIC | Age: 53
End: 2018-09-19

## 2018-09-19 NOTE — TELEPHONE ENCOUNTER
Patient did not have HCC testing and tx labs drawn as ordered 8/2018.  Attempt made to reach her unsuccessful; unable to LVM.  Attempt made to reach her son.  LVM asking that he have his mom to call us back.  Patient scheduled to see Dr. Ortiz on 11/8; appt notice mailed.  New orders mailed to her dated 9/24 to have missed testing done locally.

## 2018-09-20 LAB
AFP: 5.91
ALBUMIN/GLOB SERPL ELPH: 0.7 {RATIO}
ALBUMIN: 3.8
ALP ISOS SERPL LEV INH-CCNC: 71 U/L
ALT SERPL-CCNC: 9 U/L
ANION GAP SERPL CALC-SCNC: 16 MMOL/L
AST SERPL-CCNC: 16 U/L
BILIRUBIN, TOTAL: 2.2
BUN BLD-MCNC: 11 MG/DL (ref 4–21)
BUN/CREAT SERPL: 10
CALCIUM SERPL-MCNC: 9.5 MG/DL
CARBON DIOXIDE, CO2: 19
CHLORIDE: 105
CREAT SERPL-MCNC: 1.1 MG/DL
GFR: 51.96
GLOBULIN: 5.1
GLUCOSE: 90
POTASSIUM: 3.1
PROT SERPL-MCNC: 8.9 G/DL
SODIUM: 140

## 2018-09-25 ENCOUNTER — TELEPHONE (OUTPATIENT)
Dept: HEPATOLOGY | Facility: CLINIC | Age: 53
End: 2018-09-25

## 2018-09-25 ENCOUNTER — EPISODE CHANGES (OUTPATIENT)
Dept: HEPATOLOGY | Facility: CLINIC | Age: 53
End: 2018-09-25

## 2018-09-25 NOTE — TELEPHONE ENCOUNTER
----- Message from Tiffanie Ortiz MD sent at 9/24/2018  5:15 PM CDT -----  Pl inform pt:  Wk 8 on 9/22/18:  CMP normal.   Next lab reminder:  Wk 12 on 10/20/18:  CMP, HCV RNA quant ordered

## 2018-09-26 ENCOUNTER — EPISODE CHANGES (OUTPATIENT)
Dept: HEPATOLOGY | Facility: CLINIC | Age: 53
End: 2018-09-26

## 2018-09-26 ENCOUNTER — TELEPHONE (OUTPATIENT)
Dept: HEPATOLOGY | Facility: CLINIC | Age: 53
End: 2018-09-26

## 2018-09-26 LAB — EXT HCV RNA QUANT PCR: <15 IU/ML

## 2018-09-28 ENCOUNTER — TELEPHONE (OUTPATIENT)
Dept: HEPATOLOGY | Facility: CLINIC | Age: 53
End: 2018-09-28

## 2018-09-28 ENCOUNTER — EPISODE CHANGES (OUTPATIENT)
Dept: HEPATOLOGY | Facility: CLINIC | Age: 53
End: 2018-09-28

## 2018-09-28 NOTE — TELEPHONE ENCOUNTER
----- Message from Tiffanie Ortiz MD sent at 9/28/2018 12:28 PM CDT -----  Pl inform patient:  Wk 8 on 9/22/18:  CMP normal.  HCV RNA undetected  Reminder: next lab:  Wk 12 on 10/20/18:  CMP, HCV RNA quant ordered

## 2018-09-28 NOTE — TELEPHONE ENCOUNTER
----- Message from Tiffanie Ortiz MD sent at 9/28/2018 12:29 PM CDT -----  Continue hep C medication until 12 weeks completed.

## 2018-10-05 ENCOUNTER — TELEPHONE (OUTPATIENT)
Dept: TRANSPLANT | Facility: CLINIC | Age: 53
End: 2018-10-05

## 2018-10-05 NOTE — TELEPHONE ENCOUNTER
Call received from Dr Carroll's office requesting a copy of the patient's most recent labs.  Information faxed.

## 2018-10-26 LAB
ALBUMIN/GLOB SERPL ELPH: 0.8 {RATIO}
ALBUMIN: 4.1
ALP SERPL-CCNC: 78 U/L (ref 25–125)
ALT SERPL W P-5'-P-CCNC: 6 U/L (ref 7–35)
ANION GAP SERPL CALC-SCNC: 16 MMOL/L (ref ?–30)
AST SERPL-CCNC: 17 U/L (ref 13–35)
BILIRUB SERPL-MCNC: 2.4 MG/DL (ref 0.1–1.4)
BUN SERPL-MCNC: 11 MG/DL
BUN/CREAT SERPL: 11.1
CALCIUM SERPL-MCNC: 9.7 MG/DL (ref 8.7–10.7)
CHLORIDE SERPL-SCNC: 98 MMOL/L (ref 99–108)
CO2 SERPL-SCNC: 22 MMOL/L (ref 13–22)
CREAT SERPL-MCNC: 1 MG/DL (ref 0.5–1.1)
GFR: 58.67
GLOBULIN: 5.4
GLUCOSE SERPL-MCNC: 100 MG/DL
POTASSIUM SERPL-SCNC: 3.6 MMOL/L (ref 3.4–5.3)
SODIUM BLD-SCNC: 136 MMOL/L (ref 137–147)
TOTAL PROTEIN: 9.5 G/DL (ref 6.4–8.2)

## 2018-10-30 ENCOUNTER — TELEPHONE (OUTPATIENT)
Dept: HEPATOLOGY | Facility: CLINIC | Age: 53
End: 2018-10-30

## 2018-10-30 NOTE — TELEPHONE ENCOUNTER
----- Message from Tiffanie Ortiz MD sent at 10/30/2018  5:00 AM CDT -----  Liver enzymes are normal. Hep C test result pending.

## 2018-11-02 ENCOUNTER — TELEPHONE (OUTPATIENT)
Dept: PHARMACY | Facility: CLINIC | Age: 53
End: 2018-11-02

## 2018-11-02 NOTE — TELEPHONE ENCOUNTER
Patient doing well since completing Mavyret.  Patient states she had some nausea and poor appetite while on Mavyret and symptoms not have yet resolved.  Advised patient to contact [facility] if symptoms do not resolve by next week.  Patient is not phyiscally limited and able to complete daily activities without difficulty.  Informed patient of SVR 12 and importance.

## 2018-11-06 ENCOUNTER — EPISODE CHANGES (OUTPATIENT)
Dept: HEPATOLOGY | Facility: CLINIC | Age: 53
End: 2018-11-06

## 2018-11-06 ENCOUNTER — TELEPHONE (OUTPATIENT)
Dept: HEPATOLOGY | Facility: CLINIC | Age: 53
End: 2018-11-06

## 2018-11-06 LAB — HCV RNA QUANT PCR LOG: <15

## 2018-11-06 NOTE — TELEPHONE ENCOUNTER
Patient ordered to have an abdominal US 8/2018 - order mailed to patient.  Order mailed to patient again dated 9/24/18 for her to have test done locally.   I spoke with patient today.  She reports never getting an order/msg to have US done.

## 2018-11-07 ENCOUNTER — TELEPHONE (OUTPATIENT)
Dept: HEPATOLOGY | Facility: CLINIC | Age: 53
End: 2018-11-07

## 2018-11-07 ENCOUNTER — EPISODE CHANGES (OUTPATIENT)
Dept: HEPATOLOGY | Facility: CLINIC | Age: 53
End: 2018-11-07

## 2018-11-07 NOTE — TELEPHONE ENCOUNTER
----- Message from Tiffanie Ortiz MD sent at 11/7/2018  4:20 AM CST -----  Pl inform pt:  Wk 12 on 10/20/18:  CMP normal AST, ALT, bilirubin remains elevated. HCV RNA quant <15, not detected.  Reminder next lab:  Post-rx wk 4 on 11/17/18: HCV RNA quant ordered

## 2018-11-08 ENCOUNTER — TELEPHONE (OUTPATIENT)
Dept: HEPATOLOGY | Facility: CLINIC | Age: 53
End: 2018-11-08

## 2018-11-08 ENCOUNTER — EPISODE CHANGES (OUTPATIENT)
Dept: HEPATOLOGY | Facility: CLINIC | Age: 53
End: 2018-11-08

## 2018-11-08 ENCOUNTER — OFFICE VISIT (OUTPATIENT)
Dept: HEPATOLOGY | Facility: CLINIC | Age: 53
End: 2018-11-08
Payer: MEDICAID

## 2018-11-08 VITALS
HEIGHT: 64 IN | WEIGHT: 124 LBS | BODY MASS INDEX: 21.17 KG/M2 | HEART RATE: 70 BPM | DIASTOLIC BLOOD PRESSURE: 88 MMHG | SYSTOLIC BLOOD PRESSURE: 120 MMHG

## 2018-11-08 DIAGNOSIS — K74.69 OTHER CIRRHOSIS OF LIVER: ICD-10-CM

## 2018-11-08 DIAGNOSIS — B18.2 HEP C W/O COMA, CHRONIC: Primary | ICD-10-CM

## 2018-11-08 PROCEDURE — 99214 OFFICE O/P EST MOD 30 MIN: CPT | Mod: S$GLB,,, | Performed by: INTERNAL MEDICINE

## 2018-11-08 NOTE — PROGRESS NOTES
[facility] Follow-up Note    Reason for Consult:  The primary encounter diagnosis was Hep C w/o coma, chronic. A diagnosis of Other cirrhosis of liver was also pertinent to this visit.    PCP: Primary Doctor No       Interval history:  Hep C treatment is deferred until patient could be switched, (under the care of neurologist, Dr. Oppenheimer) from Dilantin and phenobarb to Keppra.  She made the switch, and had no seizures  for 3 months. Then her hep C was treated with Mavyret x 12 weeks. She had end-of-treatment response on 10/20/18 with HCV RNA not detected. Now she is waiting for SVR 12, which will be on 1/12/19.  She is also on HCC surveillance every 6 months with AFP and U/S abd. It was neg as of 7/31/18.        Labs and HCC surveillance during hep C treatment as follows:  7/31/18: HCC surveillance with AFP and u/s abd every 6 months, start now.   Wk 4 on 8/25 /18: CMP, HCV RNA quant ordered - not done  Wk 8 on 9/22/18:  CMP normal.  HCV RNA <15, undetected  Wk 12 on 10/20/18:  CMP normal AST, ALT, bilirubin remains elevated. HCV RNA quant <15, not detected - ETR  Post-rx wk 4 on 11/17/18: HCV RNA quant ordered  Post-rx wk 12 on 1/12/19: HCV RNA quant ordered  HCC surveillance with AFP and u/s abd 1/30/19, 7/30/19, and so on every 6 months.  Post-rx wk 24 on 4/6/19:  HCV RNA quant ordered.        Last imaging was abd MRI on 12/4/17, did not show any mass in the liver.       Kasey also had MRI of spine from her PCP, Dr. Carroll's office, which was questionably positive for malignancy involving spine, and she is referred to Oncologist at [facility], [place].    Patient had a fall, while stepping off the curb accidentally, not paying attn. And fractured right elbow, is currently in a brace.     Denies swelling in feet, abd, jaundice, itching, confusion/disorientation, memory problems.       HPI:  This is a 52 y.o. female here for evaluation of: hep C.    Patient with cirrhosis 2/2 ETOH abuse and  Hepatitis C, rheumatoid arthritis, HTN, and seizure disorder on phenytoin, is referred here for hep C treatment.      Offers no new symptoms, continues to have chr back pain.    Her hepatitis C was diagnosed approximately 2 years ago, she remains treatment naïve, she also has a history of alcohol in the past, her genotype is 1A, viral load was 5.1 million international units per mL in 2016, ultrasound of the abdomen from June 26, 2017 showed a nodular liver, borderline enlarged spleen, no mass was mentioned in an ultrasound.      Her labs on June 26, 2017 are significant for AST 74, ALT 47, total bilirubin 1.1, alpha-fetoprotein 12.7, INR 1.2, creatinine normal, GFR greater than 60.  Her hepatitis surface antibody is positive, hepatitis B antibody total was positive, HIV was negative, hemoglobin A1c was 4.7, rheumatoid arthritis factor was positive.    On October 8/9, 2017, patient went to the local emergency room at [facility] in [place], [place], for severe right-sided abdominal pain which originated in the low back and radiated towards the right flank and to the right side of the abdomen. A CT scan performed without contrast for a renal stone study showed a possible mass in the left lobe of the liver, due to the lack of contrast could not be confirmed.  Also seen were findings consistent with cirrhosis and mild enlargement of the spleen.    Interval History:  Dr Carroll , patient's PCP Phone [phone], called [facility] clinic nurse, he reported neurologist Dr. Jose A Huff (phone [phone]) had ordered MRI wo contrast, which was done on 10/18/17, it showed diffuse patchy intermediate T1, T2 signal intensity throughout bone marrow which is concerning for metastatic bone marrow disease.  Splenomegaly. Disc bulge L4-L5.      I had requested MRI w/wo contrast of abd, due to indeterminate lesion in the liver, that is still pending. If mets exist, she will not be a candidate for liver transplant.      Previous admission to [facility] for UGI bleed 8/30/16:  Transferred from [facility] for evaluation of BRBPR. Patient was admitted to the ICU there after having 3 seizures at home and found to be in septic shock 2/2 E coli bacteremia. Patient subsequently developed DIC, which improved while she was treated for pneumonia with Vanc and Zosyn. On hospital admission Day#5, patient presented with abdominal distention and nausea and imaging showing high-grade small bowel obstruction. After failing conservative management, she underwent an ex-lap with SB partial resection and lysis of adhesion on 8/9. Postop course was complicated by worsening DIC requiring multiple transfusions of pRBC, FFP, and cryo. Patient also developed maroon-colored stools, and was managed with octretide and protonix gtt.      Scopes  EGD 8/17 - esophageal varices but no active bleeding, no report of bands being placed   Colonoscopy 8/20 - poor prep, there was evidence of old blood but no active bleeding     Course of Principle Problem for Admission:   Patient had repeat endoscopy performed on admission here to [facility] due to recurrent hematochezia with anemia requiring transfusion. EGD showed evidence of grade i esophageal varices, portal hypertensive gastropathy, normal duodenum. Colonoscopy showed evidence of poor prep, with blood in the entire examined colon with no bleeding site found, thought to be small bowel in origin with most likely source being the anastomosis. Hg remained stable since that time with no recurrent episodes of hematochezia, as such patient will be transferred back to [facility] for continued care from her surgeons who performed recent bowel resection. Coagulopathy managed while inpatient with PRN transfusions of cryoprecipitate and FFP to maintain INR<1.5.     Other Medical Problems Addressed in the Hospital:   Patient also completed treatment of E Coli sepsis, and will transferred on PO [facility]  daily for SBP prophylaxis despite no evidence of ascites amenable to paracentesis.        Pertinent/Significant Diagnostic Studies:    1. EGD/Colonoscopy: detailed above  2. CT Abdomen: Postsurgical bowel changes, noting mildly prominent small bowel loops in the midabdomen, increased in caliber as compared to the previous examination. This may reflect reactive ileus in this patient with suspected intra-abdominal hematoma and ascites, differential would include slow flow through the anastomotic site or possibly partial small bowel obstruction on the basis of adhesions although no sharp transition point seen. No free air or extraluminal contrast extravasation appreciated.      Elevated liver enzymes: Yes  Abnormal imaging: Yes  Cirrhosis: Yes  Hepatitis C: Yes  Hepatitis B: No  Fatty liver: Yes  Encephalopathy: No  Post-hospital discharge: No  Symptoms: none    Primary hepatic manifestations:  Fatigue:No  Edema:No  Ascites:No  Encephalopathy:No  Abdominal pain:No  GI bleeds: Yes  Pruritus:No  Weight Changes:No  Changes in Bowel habits: No  Muscle cramps:No    Risk factors for liver disease:  No jaundice  received transfusions in 2016 for UGI bleed  No IVDU  Did not snort cocaine or similar agents  Did not live with anyone with hepatitis B or C  Sexual partner not tested  No hepatotoxic medications  No exposure to industrial toxins  Alcohol: used to drink, stopped 6 months ago, approx 4/2017      ROS:  Constitutional: No fevers, chills, weight changes, fatigue  ENT: No allergies, nosebleeds,   CV: No chest pain  Pulm: No cough, shortness of breath  Ophtho: No vision changes  GI/Liver: see HPI  Derm: No rash, itching  Heme: No swollen glands, bruising  MSK: No joint pains, joint swelling  : No dysuria, hematuria, decrease in urine output  Endo: No hot or cold intolerance  Neuro: No confusion, disorientation, difficulty with sleep, memory, concentration, syncope, seizure  Psych: No anxiety, depression    Medical  History:  has a past medical history of Arthritis, Chronic pain, Cirrhosis of liver, Hepatitis, Hypertension, and Seizures.    Surgical History:  has a past surgical history that includes Hysterectomy; Hernia repair; Cholecystectomy; Abdominal surgery; ESOPHAGOGASTRODUODENOSCOPY (EGD) (N/A, 8/25/2016); and COLONOSCOPY (N/A, 8/25/2016).    Family History: family history is not on file..     Social History:  reports that she has quit smoking. She does not have any smokeless tobacco history on file. She reports that she drinks alcohol. She reports that she does not use drugs.    Review of patient's allergies indicates:  No Known Allergies    Current Outpatient Medications   Medication Sig    glecaprevir-pibrentasvir (MAVYRET) 100-40 mg Tab Take 3 tablets by mouth once daily.    levetiracetam (KEPPRA) 500 MG Tab Take 500 mg by mouth once daily.    megestrol (MEGACE) 400 mg/10 mL (40 mg/mL) Susp Take 800 mg by mouth once daily.    ondansetron HCl, PF, 4 mg/2 mL Soln Inject 8 mg into the vein every 8 (eight) hours as needed.    pantoprazole (PROTONIX) 40 MG tablet Take 1 tablet (40 mg total) by mouth once daily.    phenytoin (DILANTIN) 100 MG ER capsule Take 100 mg by mouth 3 (three) times daily.     propranolol (INDERAL) 20 MG tablet Take 1 tablet (20 mg total) by mouth 2 (two) times daily.    ramelteon (ROZEREM) 8 mg tablet Take 1 tablet (8 mg total) by mouth nightly as needed for Insomnia.    senna-docusate 8.6-50 mg (PERICOLACE) 8.6-50 mg per tablet Take 1 tablet by mouth once daily.     No current facility-administered medications for this visit.        Objective Findings:    Vital Signs:  LMP  (LMP Unknown)   There is no height or weight on file to calculate BMI.    Physical Exam:  General Appearance: Well appearing in no acute distress  Head:   Normocephalic, without obvious abnormality  Eyes:    No scleral icterus, EOMI  ENT: Neck supple, Lips, mucosa, and tongue normal; teeth and gums normal  Lungs: CTA  bilaterally in anterior and posterior fields, no wheezes, no crackles.  Heart:  Regular rate and rhythm, S1, S2 normal, no murmurs heard  Abdomen: Soft, non tender, non distended with positive bowel sounds in all four quadrants. No hepatosplenomegaly, ascites, or mass  Extremities: 2+ pulses, no clubbing, cyanosis or edema  Skin: No rash  Neurologic: CN II-XII intact, alert, oriented x 3. No asterixis      Labs:  Lab Results   Component Value Date    WBC 4.93 03/22/2018    HGB 14.2 03/22/2018    HCT 41 03/22/2018     (A) 12/07/2017    INR 1.3 07/03/2018    CREATININE 1.0 (A) 10/26/2018    BUN 11 10/26/2018    BILITOT 2.4 (A) 10/26/2018    ALT 6 (A) 10/26/2018    AST 17 10/26/2018    ALKPHOS 78 10/26/2018     (A) 10/26/2018    K 3.6 10/26/2018    CL 98 (A) 10/26/2018    CO2 22 10/26/2018    TSH 2.761 08/20/2016    AFP 5.91 09/20/2018       Imaging:       Endoscopy:      Assessment:  1. Hep C w/o coma, chronic    2. Other cirrhosis of liver         Hepatitis C genotype 1A, treatment naïve, needs to be treated after confirming the status of the liver mass that was seen on a CT scan on October 8/9 2017, and follow-up MRI on Dec 4, 2017.  If she has confirmed HCC, would wait until after potential transplant for treatment for hep C will be started.  If, on the other hand, there is no mass found in the liver, then we will proceed with Hep C treatment.  However, she needs to be off the phenytoin and switch to another anti-seizure medication which will not interact with Hep C medications.      CT reviewed in IR conf on 11/7/17:  Plan: CT from 10/27/17 shows a vague 3.6 cm area of hyperattenuation in the let lobe on non contrast portion with ? Washout and possible subtle enhancement.  Hounsfield units are a little higher that the liver parenchyma.  This is indeterminate. Rec MRI now. it was finally done on 12/4/17, and no enhancing mass was seen on MRI.       In order to make the switch of anti-seizure  medications, she saw a neurologist, Dr Huff, who obtained and mRI of spine because of chronic back pain.. Patient unaware of any positive mRI spine findings but her PCP, Dr Carroll ([phone]) called our [facility] nurse and reported that neurologist Dr. Jose A Huff (phone [phone]) had ordered MRI wo contrast, which was done on 10/18/17, it showed diffuse patchy intermediate T1, T2 signal intensity throughout bone marrow which is concerning for metastatic bone marrow disease.  Splenomegaly. Disc bulge L4-L5.   We repeated her abd mRI, which included views of the spine, did not see metastatic disease.      Plan:  Hep C labs:  Post-rx wk 4 on 11/17/18: HCV RNA quant ordered  Post-rx wk 12 on 1/12/19: HCV RNA quant ordered  Post-rx wk 24 on 4/6/19:  HCV RNA quant ordered.  -  10 lb wt loss in the last 6 weeks.  Patient states she had mRI of the spine again about 3-4 weeks ago, report went to Dr. Carroll.  Will get AFP and u/s now instead of waiting till January 2019.  - paper order given.    -  CBC, CMP, PT INR every 6 months  -  HCC surveillance with AFP and u/s abd every 6 months.  -  Low salt in the diet, avoid canned, bottled and processed foods.  -  Continue current meds  -  CBC, CMP, PT INR today, followed by every 3 months  -  Avoid alcohol, smoking, sedatives and meds with codeine.  -  No driving  -  Avoid high intake of Tylenol (more than 4 extra-strength pills in one day)  -  Call us if any bleeding, fevers, confusion, disorientation occur  -  Endoscopy: Per local GI physician  -  Transplant option discussed, will evaluate if masses seen in the liver or when MELD >15.  -  Return in 6 months.      Follow-up in about 6 months (around 5/8/2019).      Order summary:  No orders of the defined types were placed in this encounter.      Thank you so much for allowing me to participate in the care of Gagan Ortiz MD

## 2018-11-08 NOTE — Clinical Note
Plan:Hep C labs:Post-rx wk 4 on 11/17/18: HCV RNA quant orderedPost-rx wk 12 on 1/12/19: HCV RNA quant orderedPost-rx wk 24 on 4/6/19:  HCV RNA quant ordered.-  10 lb wt loss in the last 6 weeks.  Patient states she had mRI of the spine again about 3-4 weeks ago, report went to Dr. Carroll.  Will get AFP and u/s now instead of waiting till January 2019.  - paper order given.-  CBC, CMP, PT INR every 6 months-  HCC surveillance with AFP and u/s abd every 6 months.-  Low salt in the diet, avoid canned, bottled and processed foods.-  Continue current meds-  CBC, CMP, PT INR today, followed by every 3 months-  Avoid alcohol, smoking, sedatives and meds with codeine.-  No driving-  Avoid high intake of Tylenol (more than 4 extra-strength pills in one day)-  Call us if any bleeding, fevers, confusion, disorientation occur-  Endoscopy: Per local GI physician-  Transplant option discussed, will evaluate if masses seen in the liver or when MELD >15.-  Return in 6 months.

## 2018-11-08 NOTE — TELEPHONE ENCOUNTER
Orders noted.  Orders faxed to [facility] and [facility] for patient to have testing done locally through 11/2019.  6 mth f/u visit with Dr. Ortiz in [place] placed in recall system.

## 2018-11-08 NOTE — TELEPHONE ENCOUNTER
----- Message from Tiffanie Ortiz MD sent at 11/8/2018  1:49 PM CST -----  Plan:  Hep C labs:  Post-rx wk 4 on 11/17/18: HCV RNA quant ordered  Post-rx wk 12 on 1/12/19: HCV RNA quant ordered  Post-rx wk 24 on 4/6/19:  HCV RNA quant ordered.  -  10 lb wt loss in the last 6 weeks.  Patient states she had mRI of the spine again about 3-4 weeks ago, report went to Dr. Carroll.  Will get AFP and u/s now instead of waiting till January 2019.  - paper order given.    -  CBC, CMP, PT INR every 6 months  -  HCC surveillance with AFP and u/s abd every 6 months.  -  Low salt in the diet, avoid canned, bottled and processed foods.  -  Continue current meds  -  CBC, CMP, PT INR today, followed by every 3 months  -  Avoid alcohol, smoking, sedatives and meds with codeine.  -  No driving  -  Avoid high intake of Tylenol (more than 4 extra-strength pills in one day)  -  Call us if any bleeding, fevers, confusion, disorientation occur  -  Endoscopy: Per local GI physician  -  Transplant option discussed, will evaluate if masses seen in the liver or when MELD >15.  -  Return in 6 months.

## 2018-11-08 NOTE — PATIENT INSTRUCTIONS
Plan:  Hep C labs:  Post-rx wk 4 on 11/17/18: HCV RNA quant ordered  Post-rx wk 12 on 1/12/19: HCV RNA quant ordered  Post-rx wk 24 on 4/6/19:  HCV RNA quant ordered.  -  10 lb wt loss in the last 6 weeks.  Patient states she had mRI of the spine again about 3-4 weeks ago, report went to Dr. Carroll.  Will get AFP and u/s now instead of waiting till January 2019.  - paper order given.    -  CBC, CMP, PT INR every 6 months  -  HCC surveillance with AFP and u/s abd every 6 months.  -  Low salt in the diet, avoid canned, bottled and processed foods.  -  Continue current meds  -  CBC, CMP, PT INR today, followed by every 3 months  -  Avoid alcohol, smoking, sedatives and meds with codeine.  -  No driving  -  Avoid high intake of Tylenol (more than 4 extra-strength pills in one day)  -  Call us if any bleeding, fevers, confusion, disorientation occur  -  Endoscopy: Per local GI physician  -  Transplant option discussed, will evaluate if masses seen in the liver or when MELD >15.  -  Return in 6 months.

## 2018-11-09 ENCOUNTER — EPISODE CHANGES (OUTPATIENT)
Dept: HEPATOLOGY | Facility: CLINIC | Age: 53
End: 2018-11-09

## 2018-11-15 ENCOUNTER — TELEPHONE (OUTPATIENT)
Dept: HEPATOLOGY | Facility: CLINIC | Age: 53
End: 2018-11-15

## 2018-11-15 NOTE — TELEPHONE ENCOUNTER
Received ultrasound report dated 10/11/18 from [facility] put in Dr Ortiz's folder for review.

## 2018-11-16 ENCOUNTER — EPISODE CHANGES (OUTPATIENT)
Dept: HEPATOLOGY | Facility: CLINIC | Age: 53
End: 2018-11-16

## 2018-11-19 ENCOUNTER — EPISODE CHANGES (OUTPATIENT)
Dept: HEPATOLOGY | Facility: CLINIC | Age: 53
End: 2018-11-19

## 2018-11-19 ENCOUNTER — TELEPHONE (OUTPATIENT)
Dept: HEPATOLOGY | Facility: CLINIC | Age: 53
End: 2018-11-19

## 2018-11-19 LAB
AFP-TUMOR MARKER: 6.33
ALBUMIN/GLOBULIN RATIO: 0.9
ALBUMIN: 4.3
ALP ISOS SERPL LEV INH-CCNC: 72 U/L
ALT SERPL-CCNC: 8 U/L
ANION GAP SERPL CALC-SCNC: 15 MMOL/L
AST SERPL-CCNC: 19 U/L
BASOPHILS ABSOLUTE COUNT: 0 /ΜL
BASOPHILS NFR BLD: 1 %
BILIRUBIN, TOTAL: 0.9
BUN BLD-MCNC: 14 MG/DL (ref 4–21)
BUN/CREAT SERPL: 13.7
CALCIUM SERPL-MCNC: 9.4 MG/DL
CARBON DIOXIDE, CO2: 24
CHLORIDE: 98
CREAT SERPL-MCNC: 1 MG/DL
EOSINOPHILS ABSOLUTE COUNT: 0 /ΜL
ERYTHROCYTE [DISTWIDTH] IN BLOOD BY AUTOMATED COUNT: 51.8 % (ref 11.5–14.5)
GFR: 56.69
GLOBULIN: 4.7
GLUCOSE: 75
HCT VFR BLD AUTO: 39 % (ref 36–46)
HGB BLD-MCNC: 12.9 G/DL (ref 12–16)
IMMATURE GRANS (ABS): 0.01
IMMATURE GRANULOCYTES: 0.3
LYMPHOCYTES %: 54 % (ref 18–52)
LYMPHOCYTES ABSOLUTE COUNT: 2 /?L
MCH RBC QN AUTO: 31.2 PG (ref 26–34)
MCHC RBC AUTO-ENTMCNC: 33 G/DL (ref 30–37)
MCV RBC AUTO: 93.7 FL (ref 82–108)
MONOCYTES %: 7
MONOCYTES ABSOLUTE COUNT: 0.25
NEUTROPHILS ABSOLUTE COUNT: 1 /ΜL
NEUTROPHILS RELATIVE PERCENT: 35 % (ref 46–78)
NRBC: 0
NUCLEATED RBC/100 LEUKOCYTES: 0
PLATELET # BLD AUTO: 142 K/ΜL (ref 150–399)
POTASSIUM: 3.6
RBC # BLD AUTO: 4.13 10^6/ΜL (ref 4–5.2)
SISOMICIN TITR SBT: 2 % (ref 0–6)
SODIUM: 137
TOTAL PROTEIN: 9 G/DL (ref 6.4–8.2)
WBC: 3.57

## 2018-11-19 NOTE — TELEPHONE ENCOUNTER
----- Message from Tiffanie Ortiz MD sent at 11/19/2018  2:24 PM CST -----  Please inform patient results are OK.     Mailed lab results...........

## 2018-11-19 NOTE — TELEPHONE ENCOUNTER
----- Message from Tiffanie Ortiz MD sent at 11/19/2018  2:24 PM CST -----  Please inform patient results are OK.

## 2018-11-23 ENCOUNTER — TELEPHONE (OUTPATIENT)
Dept: HEPATOLOGY | Facility: CLINIC | Age: 53
End: 2018-11-23

## 2018-11-23 NOTE — TELEPHONE ENCOUNTER
I spoke with patient and msg from Dr. Ortiz relayed.  Msg from Angel along with lab results faxed to Dr. Carroll for review.

## 2018-11-23 NOTE — TELEPHONE ENCOUNTER
----- Message from Tiffanie Ortiz MD sent at 11/23/2018  1:57 PM CST -----  Please inform patient:  Labs are ok, except for elevated globulin level.  Please send copy of her CMP report to Dr Carroll, patient's PCP (call Phone [phone] to get fax number), in case he would like to refer her to a local hematologist.   Thanks

## 2018-12-05 ENCOUNTER — EPISODE CHANGES (OUTPATIENT)
Dept: HEPATOLOGY | Facility: CLINIC | Age: 53
End: 2018-12-05

## 2018-12-30 ENCOUNTER — TELEPHONE (OUTPATIENT)
Dept: HEPATOLOGY | Facility: CLINIC | Age: 53
End: 2018-12-30

## 2018-12-31 NOTE — TELEPHONE ENCOUNTER
US 10/11/18:  Cirrhosis, enlarged spleen, no focal lesion in the liver. No ascites.     Pl inform pt:  No tumor in the liver.    Continue HCC surveillance with AFP and US abd every 6 months, next due in April 2019.

## 2019-01-11 ENCOUNTER — EPISODE CHANGES (OUTPATIENT)
Dept: HEPATOLOGY | Facility: CLINIC | Age: 54
End: 2019-01-11

## 2019-01-18 ENCOUNTER — EPISODE CHANGES (OUTPATIENT)
Dept: HEPATOLOGY | Facility: CLINIC | Age: 54
End: 2019-01-18

## 2019-01-21 ENCOUNTER — EPISODE CHANGES (OUTPATIENT)
Dept: HEPATOLOGY | Facility: CLINIC | Age: 54
End: 2019-01-21

## 2019-01-21 ENCOUNTER — TELEPHONE (OUTPATIENT)
Dept: HEPATOLOGY | Facility: CLINIC | Age: 54
End: 2019-01-21

## 2019-01-21 LAB — EXT HCV RNA QUANT PCR: <15 IU/ML

## 2019-01-22 ENCOUNTER — EPISODE CHANGES (OUTPATIENT)
Dept: HEPATOLOGY | Facility: CLINIC | Age: 54
End: 2019-01-22

## 2019-01-22 ENCOUNTER — TELEPHONE (OUTPATIENT)
Dept: HEPATOLOGY | Facility: CLINIC | Age: 54
End: 2019-01-22

## 2019-01-22 NOTE — TELEPHONE ENCOUNTER
Attempt made to reach patient.  Msg from  left on her VM.  Testing already setup per previous orders.

## 2019-01-22 NOTE — TELEPHONE ENCOUNTER
----- Message from Tiffanie Ortiz MD sent at 1/22/2019  5:40 AM CST -----  Pl inform patient:  Post-rx wk 12 on 1/12/19: HCV RNA quant <15 not detected. Hep C cured.   Future labs and u/s:  HCC surveillance with AFP and u/s abd 1/30/19, 7/30/19, and so on every 6 months.  Post-rx wk 24 on 4/6/19:  HCV RNA quant ordered.

## 2019-01-23 ENCOUNTER — TELEPHONE (OUTPATIENT)
Dept: HEPATOLOGY | Facility: CLINIC | Age: 54
End: 2019-01-23

## 2019-01-23 NOTE — TELEPHONE ENCOUNTER
----- Message from Desiree Vazquez MA sent at 1/22/2019  4:26 PM CST -----  Contact: pt      ----- Message -----  From: Gonsalo Kitchen  Sent: 1/22/2019   8:43 AM  To: [facility] Staff    She's calling in regards to her test results, [phone] (home)

## 2019-02-14 DIAGNOSIS — D69.6 THROMBOCYTOPENIA: Primary | ICD-10-CM

## 2019-02-15 RX ORDER — PROPRANOLOL HYDROCHLORIDE 20 MG/1
20 TABLET ORAL DAILY
COMMUNITY

## 2019-02-15 RX ORDER — LEVETIRACETAM 500 MG/1
500 TABLET ORAL 2 TIMES DAILY
COMMUNITY

## 2019-02-15 RX ORDER — HYDROCHLOROTHIAZIDE 12.5 MG/1
12.5 TABLET ORAL DAILY
COMMUNITY
End: 2019-02-18

## 2019-02-15 RX ORDER — AMLODIPINE BESYLATE 10 MG/1
10 TABLET ORAL DAILY
COMMUNITY

## 2019-02-18 ENCOUNTER — OFFICE VISIT (OUTPATIENT)
Dept: HEMATOLOGY/ONCOLOGY | Facility: CLINIC | Age: 54
End: 2019-02-18
Payer: MEDICAID

## 2019-02-18 VITALS
SYSTOLIC BLOOD PRESSURE: 109 MMHG | RESPIRATION RATE: 18 BRPM | HEART RATE: 70 BPM | OXYGEN SATURATION: 92 % | DIASTOLIC BLOOD PRESSURE: 75 MMHG | TEMPERATURE: 98 F

## 2019-02-18 DIAGNOSIS — D69.6 THROMBOCYTOPENIA: ICD-10-CM

## 2019-02-18 DIAGNOSIS — R79.89 LIVER FUNCTION TEST ABNORMALITY: ICD-10-CM

## 2019-02-18 DIAGNOSIS — R16.0 LIVER MASS, LEFT LOBE: Primary | ICD-10-CM

## 2019-02-18 DIAGNOSIS — R93.89 ABNORMAL FINDING ON IMAGING: ICD-10-CM

## 2019-02-18 PROCEDURE — 99213 PR OFFICE/OUTPT VISIT, EST, LEVL III, 20-29 MIN: ICD-10-PCS | Mod: Q6,,, | Performed by: INTERNAL MEDICINE

## 2019-02-18 PROCEDURE — 99213 OFFICE O/P EST LOW 20 MIN: CPT | Mod: Q6,,, | Performed by: INTERNAL MEDICINE

## 2019-02-18 RX ORDER — SUCRALFATE 1 G/1
1 TABLET ORAL
COMMUNITY

## 2019-02-18 RX ORDER — METOCLOPRAMIDE 10 MG/1
10 TABLET ORAL 4 TIMES DAILY
COMMUNITY

## 2019-02-18 RX ORDER — ONDANSETRON 4 MG/1
4 TABLET, FILM COATED ORAL 3 TIMES DAILY PRN
Refills: 4 | COMMUNITY
Start: 2019-01-24

## 2019-02-18 RX ORDER — POTASSIUM CHLORIDE 1.5 G/1.58G
1 POWDER, FOR SOLUTION ORAL DAILY
COMMUNITY

## 2019-02-18 RX ORDER — AMANTADINE HYDROCHLORIDE 100 MG/1
100 CAPSULE, GELATIN COATED ORAL DAILY PRN
Refills: 4 | COMMUNITY
Start: 2019-01-27

## 2019-02-18 NOTE — PROGRESS NOTES
Subjective:      Patient ID: Kasey Green is a 53 y.o. female.    Oncology History:   No history exists.   Ms Yo has a known history of alcoholism and Hepatitis C with cirrhoisis.  She was treated with Mavyret (Glecaprevir + Pibrentasvir) and Hep C virus was undetectable since 10/20/18, after she completed treatment.  She was referred to hematology/oncology because CT of the abdomen done on 10/18/17 (w/o contrast) during an admission for hematochezia showed an indeterminate mass, but further characterization could not be done due to lack of contrast.   The patient also underwent an MRI of the lumbar spine on 10/18/17 which showed diffuse patchy intermediate T1 and T2 signal intensity throughout the bone marrow concerning for metastatic disease, and an enlarged spleen measuring 12.8 cm.    Chief Complaint: No chief complaint on file.    Kasey Green is here to discuss results of tests ordered at the last visit.  [No treatment plan]  Ms Yo has a known history of alcoholism and Hepatitis C with cirrhoisis.  She was treated with Mavyret (Glecaprevir + Pibrentasvir) and Hep C virus was undetectable since 10/20/18, after she completed treatment.  She was referred to hematology/oncology because CT of the abdomen done on 10/18/17 (w/o contrast) during an admission for hematochezia showed an indeterminate mass, but further characterization could not be done due to lack of contrast.   The patient also underwent an MRI of the lumbar spine on 10/18/17 which showed diffuse patchy intermediate T1 and T2 signal intensity throughout the bone marrow concerning for metastatic disease, and an enlarged spleen measuring 12.8 cm.            Past Medical History:   Diagnosis Date    Arthritis     Chronic pain     Cirrhosis of liver     Hepatitis     Hypertension     Seizures      No family history on file.  Social History     Socioeconomic History    Marital status: Single     Spouse name: Not on file     Number of children: Not on file    Years of education: Not on file    Highest education level: Not on file   Social Needs    Financial resource strain: Not on file    Food insecurity - worry: Not on file    Food insecurity - inability: Not on file    Transportation needs - medical: Not on file    Transportation needs - non-medical: Not on file   Occupational History    Not on file   Tobacco Use    Smoking status: Former Smoker   Substance and Sexual Activity    Alcohol use: Yes     Comment: 2.5 months since last consumption    Drug use: No    Sexual activity: Not on file   Other Topics Concern    Not on file   Social History Narrative    Not on file     Past Surgical History:   Procedure Laterality Date    ABDOMINAL SURGERY      Small Bowel Resection     CHOLECYSTECTOMY      COLONOSCOPY N/A 8/25/2016    [facility] ([facility]ND F[facility])    ESOPHAGOGASTRODUODENOSCOPY (EGD) N/A 8/25/2016    [facility]formed [facility] at [facility] (2[facility] F[facility])    HERNIA REPAIR      HYSTERECTOMY             Review of systems:  Review of Systems    Objective:     Physical Exam  There were no vitals filed for this visit.There is no height or weight on file to calculate BSA.    Labs:      Assessment:      1. Liver mass, left lobe           Plan:   Liver mass, left lobe        Darline Hugo MD

## 2019-02-18 NOTE — PROGRESS NOTES
Subjective:      Patient ID: Kasey Green is a 53 y.o. female.    Chief Complaint: Results  Ms Yo has a known history of alcoholism and Hepatitis C with cirrhoisis.  She was treated with Mavyret (Glecaprevir + Pibrentasvir) and Hep C virus was undetectable since 10/20/18, after she completed treatment.  She was referred to hematology/oncology because CT of the abdomen done on 10/18/17 (w/o contrast) during an admission for hematochezia showed an indeterminate mass, but further characterization could not be done due to lack of contrast.   The patient also underwent an MRI of the lumbar spine on 10/18/17 which showed diffuse patchy intermediate T1 and T2 signal intensity throughout the bone marrow concerning for metastatic disease, and an enlarged spleen measuring 12.8 cm.      Past Medical History:   Diagnosis Date    Arthritis     Chronic pain     Cirrhosis of liver     Hepatitis     Hypertension     Seizures      No family history on file.  Social History     Socioeconomic History    Marital status: Single     Spouse name: Not on file    Number of children: Not on file    Years of education: Not on file    Highest education level: Not on file   Social Needs    Financial resource strain: Not on file    Food insecurity - worry: Not on file    Food insecurity - inability: Not on file    Transportation needs - medical: Not on file    Transportation needs - non-medical: Not on file   Occupational History    Not on file   Tobacco Use    Smoking status: Former Smoker   Substance and Sexual Activity    Alcohol use: Yes     Comment: 2.5 months since last consumption    Drug use: No    Sexual activity: Not on file   Other Topics Concern    Not on file   Social History Narrative    Not on file     Past Surgical History:   Procedure Laterality Date    ABDOMINAL SURGERY      Small Bowel Resection     CHOLECYSTECTOMY      COLONOSCOPY N/A 8/25/2016    [facility]formed [facility]  ENDO (2ND FLR)    ESOPHAGOGASTRODUODENOSCOPY (EGD) N/A 8/25/2016    Performed by [facility] at [facility] ENDO (2ND FLR)    HERNIA REPAIR      HYSTERECTOMY             Review of systems:  Review of Systems   Constitutional: Negative for fever, malaise/fatigue and weight loss.   Cardiovascular: Negative for chest pain, palpitations and leg swelling.   Gastrointestinal: Negative for abdominal pain, nausea and vomiting.   Genitourinary: Negative for dysuria and hematuria.        Objective:     Physical Exam  Vitals:    02/18/19 1008   BP: 109/75   Pulse: 70   Resp: 18   Temp: 97.6 °F (36.4 °C)   There is no height or weight on file to calculate BSA.  General: Middle aged woman who appears to be stated age.  No acute distress.  Neuro-psych:  Alert and oriented x 4 with no gross neurological deficits.  Mood and affect normal.  Appears to be fully in control and able to make medical decisions.    Labs:  Lab Results   Component Value Date    WBC 3.57 11/15/2018    HGB 12.9 11/15/2018    HCT 39 11/15/2018    MCV 93.7 11/15/2018     (A) 11/15/2018     CMP  Sodium   Date Value Ref Range Status   11/15/2018 137  Final     Potassium   Date Value Ref Range Status   11/15/2018 3.6  Final     Chloride   Date Value Ref Range Status   11/15/2018 98  Final     CO2   Date Value Ref Range Status   11/15/2018 24  Final     Glucose   Date Value Ref Range Status   10/26/2018 100 mg/dL Final     BUN   Date Value Ref Range Status   11/15/2018 14 4 - 21 mg/dL Final     Creatinine   Date Value Ref Range Status   11/15/2018 1.0 mg/dL Final     Calcium   Date Value Ref Range Status   11/15/2018 9.4 mg/dL Final     Total Protein   Date Value Ref Range Status   11/15/2018 9.0 (A) 6.4 - 8.2 g/dL Final   09/20/2018 8.9 g/dL Final     Albumin   Date Value Ref Range Status   11/15/2018 4.3  Final     Total Bilirubin   Date Value Ref Range Status   10/26/2018 2.4 (A) 0.1 - 1.4 mg/dL Final     Alkaline Phosphatase   Date Value Ref Range  Status   10/26/2018 78 25 - 125 U/L Final     AST   Date Value Ref Range Status   11/15/2018 19 U/L Final     ALT   Date Value Ref Range Status   11/15/2018 8 U/L Final     Anion Gap   Date Value Ref Range Status   11/15/2018 15 mmol/L Final     eGFR if    Date Value Ref Range Status   08/30/2016 >60.0 >60 mL/min/1.73 m^2 Final   08/30/2016 >60.0 >60 mL/min/1.73 m^2 Final     eGFR if non    Date Value Ref Range Status   03/22/2018 54.0 mg/dL Final     Labs done on 1/31/19 [facility] were reviewed.  SPEP and NIKOLE showed polyclonal gammopathy, both kappa and lambda serum free light chains were elevated, with a kappa/lambda ratio of 2.45, which is high.  IgG was high at 2345 and IgM was high at 280.  IgA was normal at 267.    Radiology:  Skeletal survey done on 2/12/19 showed no definite areas of bony destruction.  Ultrasound scan abdomen done on 2/8/19 showed hepatic cirrhosis but no definite mass.    Assessment:      1. Liver mass, left lobe       2. 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  Polyclonal gammopathy can be seen in chronic liver disease.  3. Mild Thrombocytopenia, neutropenia and an enlarged spleen all likely secondary to cirrhosis.  3. Bone marrow changes on MRI likely due to increased hematopoiesis secondary to hypersplenism.    Plan:   Liver mass, left lobe    Check a repeat CT scan of the abdomen with contrast in 3 months.    Polyclonal gammopathy: Likely due to liver disease.  Labs reviewed with the patient. No further work up for Myeloma needed, recommend surveillance with repeat CBC with diff, CMP and SPEP in 3 months.    Today I spent about 20 minutes in patient care and co-ordination.  More than 50% of that time was spent in face-to-face patient counseling.    Follow up in 3 months with labs and CT scan results.    Darline Hugo MD

## 2019-03-04 LAB
ABSOLUTE LYMPHOCYTES: 2.11
ALBUMIN/GLOB SERPL ELPH: 0.9 {RATIO}
ALBUMIN: 4.4
ALP ISOS SERPL LEV INH-CCNC: 71 U/L
ANION GAP SERPL CALC-SCNC: 14 MMOL/L
AST SERPL-CCNC: 18 U/L
BASOPHILS NFR BLD: 0 %
BASOPHILS NFR BLD: 1 %
BILIRUBIN, TOTAL: 0.92
BUN BLD-MCNC: 12 MG/DL (ref 4–21)
BUN/CREAT SERPL: 9.9
CALCIUM SERPL-MCNC: 10.3 MG/DL
CARBON DIOXIDE, CO2: 25
CHLORIDE: 101
CREAT SERPL-MCNC: 1.2 MG/DL
EOSINOPHIL NFR BLD: 0 %
EOSINOPHIL NFR BLD: 1 %
ERYTHROCYTE [DISTWIDTH] IN BLOOD BY AUTOMATED COUNT: 49.5 %
GFR: 48.2
GLOBULIN: 4.8
GLUCOSE: 93
HCT VFR BLD AUTO: 42 % (ref 36–46)
HGB BLD-MCNC: 14.1 G/DL (ref 12–16)
IMMATURE GRANS (ABS): 0.01
IMMATURE GRANULOCYTES: 0.2
INR PPP: 1.2 (ref 2–3)
LYMPHOCYTES %: 52 % (ref 18–52)
MCHC RBC AUTO-ENTMCNC: 33.5 G/DL
MCV RBC AUTO: 97 FL
MONOCYTES NFR BLD: 0.29 %
MONOCYTES NFR BLD: 7.1 %
NEUTROPHILS ABSOLUTE COUNT: 2 /ΜL
NEUTROPHILS NFR BLD: 39.7 %
POTASSIUM: 4.4
PROT SNV-MCNC: 9.2 G/L
PT: 12.2
RBC # BLD AUTO: 4.34 10*6/UL
SODIUM: 140
WBC: 4.07

## 2019-03-14 ENCOUNTER — EPISODE CHANGES (OUTPATIENT)
Dept: HEPATOLOGY | Facility: CLINIC | Age: 54
End: 2019-03-14

## 2019-03-15 LAB
ABS NRBC COUNT: 0 X 10 3/UL (ref 0–0.01)
ABSOLUTE BASOPHIL: 0.01 X 10 3/UL (ref 0–0.22)
ABSOLUTE EOSINOPHIL: 0.04 X 10 3/UL (ref 0.04–0.54)
ABSOLUTE IMMATURE GRAN: 0.01 X 10 3/UL (ref 0–0.04)
ABSOLUTE LYMPHOCYTE: 1.67 X 10 3/UL (ref 0.86–4.75)
ABSOLUTE MONOCYTE: 0.26 X 10 3/UL (ref 0.22–1.08)
ALBUMIN SERPL-MCNC: 4.3 G/DL (ref 3.5–5.2)
ALBUMIN, ELECTROPHORESIS: 4.05 G/DL (ref 3.2–5.3)
ALBUMIN/GLOB SERPL ELPH: 0.82 G/DL (ref 1–2.7)
ALBUMIN/GLOB SERPL ELPH: 0.9 {RATIO} (ref 1–2.7)
ALP ISOS SERPL LEV INH-CCNC: 78 IU/L (ref 35–105)
ALPHA 1: 0.27 G/DL (ref 0.1–0.4)
ALPHA 2: 0.77 G/DL (ref 0.6–1)
ALT (SGPT): 18 U/L (ref 0–33)
ANION GAP SERPL CALC-SCNC: 12 MMOL/L (ref 8–17)
AST SERPL-CCNC: 28 U/L (ref 0–32)
BASOPHILS NFR BLD: 0.3 %
BETA: 1.06 G/DL (ref 0.6–1.3)
BILIRUBIN, TOTAL: 1.03 MG/DL (ref 0–1.2)
BUN/CREAT SERPL: 7 (ref 6–20)
CALCIUM SERPL-MCNC: 9.9 MG/DL (ref 8.6–10.2)
CARBON DIOXIDE, CO2: 29 MMOL/L (ref 22–29)
CHLORIDE: 88 MMOL/L (ref 98–107)
CREAT SERPL-MCNC: 0.87 MG/DL (ref 0.5–0.9)
EOSINOPHIL NFR BLD: 1.3 %
GAMMA: 2.85 G/DL (ref 0.7–1.5)
GFR ESTIMATION: 67.85
GLOBULIN: 4.7 G/DL (ref 1.5–4.5)
GLUCOSE: 90 MG/DL (ref 74–106)
HCT VFR BLD AUTO: 43.1 % (ref 37–47)
HGB BLD-MCNC: 14.7 G/DL (ref 12–16)
IMMATURE GRANULOCYTES: 0.3 % (ref 0–0.5)
LYMPHOCYTES NFR BLD: 54.8 %
MCH RBC QN AUTO: 32.9 PG (ref 27–32)
MCHC RBC AUTO-ENTMCNC: 34.1 G/DL (ref 32–36)
MCV RBC AUTO: 96.4 FL (ref 82–100)
MONOCYTES NFR BLD: 8.5 %
NEUTROPHILS ABSOLUTE COUNT: 1.06 X 10 3/UL (ref 2.15–7.56)
NEUTROPHILS NFR BLD: 34.8 %
NUCLEATED RED BLOOD CELLS: 0 /100 WBC (ref 0–0.2)
PEPSIN A INTERPRETATION: ABNORMAL
PLATELET # BLD AUTO: 145 X 10 3/UL (ref 135–400)
POTASSIUM: 3.4 MMOL/L (ref 3.5–5.1)
PROT SNV-MCNC: 9 G/DL (ref 6.4–8.3)
RBC # BLD AUTO: 4.47 X 10 6/UL (ref 4.2–5.4)
RDW-SD: 47.1 FL (ref 37–54)
SODIUM: 129 MMOL/L (ref 136–145)
UREA NITROGEN (BUN): 6.1 MG/DL (ref 6–20)
WBC # BLD: 3.05 X 10 3/UL (ref 4.3–10.8)

## 2019-03-27 ENCOUNTER — EPISODE CHANGES (OUTPATIENT)
Dept: HEPATOLOGY | Facility: CLINIC | Age: 54
End: 2019-03-27

## 2019-03-27 ENCOUNTER — TELEPHONE (OUTPATIENT)
Dept: HEPATOLOGY | Facility: CLINIC | Age: 54
End: 2019-03-27

## 2019-03-27 NOTE — TELEPHONE ENCOUNTER
I spoke with patient.  Appt with Dr. Ortiz scheduled 5/23; appt notice mailed with copy of testing needed through 11/2019.

## 2019-03-27 NOTE — TELEPHONE ENCOUNTER
----- Message from Leigh Cabral RN sent at 3/26/2019  3:00 PM CDT -----  Contact: Patient      ----- Message -----  From: Torie Tracey  Sent: 3/26/2019  11:38 AM  To: [facility] Hepatitis C Staff    Needs Advice    Reason for call: Patient called to schedule f/u appt.        Communication Preference: [phone]    Additional Information: n/a

## 2019-03-29 DIAGNOSIS — D69.6 THROMBOCYTOPENIA: Primary | ICD-10-CM

## 2019-03-29 DIAGNOSIS — K74.69 OTHER CIRRHOSIS OF LIVER: ICD-10-CM

## 2019-03-29 DIAGNOSIS — D62 ANEMIA ASSOCIATED WITH ACUTE BLOOD LOSS: ICD-10-CM

## 2019-03-29 DIAGNOSIS — B18.2 HEP C W/O COMA, CHRONIC: ICD-10-CM

## 2019-04-05 ENCOUNTER — TELEPHONE (OUTPATIENT)
Dept: HEPATOLOGY | Facility: CLINIC | Age: 54
End: 2019-04-05

## 2019-04-05 NOTE — TELEPHONE ENCOUNTER
----- Message from Desiree Vazquez MA sent at 3/28/2019  4:58 PM CDT -----      ----- Message -----  From: Shanelle Patel RN  Sent: 3/7/2019   3:15 PM  To: [facility] Hepat Clinical Staff        ----- Message -----  From: Tiffanie Ortiz MD  Sent: 3/5/2019   4:00 PM  To: [facility] Pre-Liver Transplant Clinical    Pl check original lab report and correct the value for GFR entered in epic. Otherwise OK

## 2019-04-10 ENCOUNTER — TELEPHONE (OUTPATIENT)
Dept: HEPATOLOGY | Facility: CLINIC | Age: 54
End: 2019-04-10

## 2019-04-10 LAB
ABS NRBC COUNT: 0 X 10 3/UL (ref 0–0.01)
ABSOLUTE BASOPHIL: 0.04 X 10 3/UL (ref 0–0.22)
ABSOLUTE EOSINOPHIL: 0.08 X 10 3/UL (ref 0.04–0.54)
ABSOLUTE IMMATURE GRAN: 0.01 X 10 3/UL (ref 0–0.04)
ABSOLUTE LYMPHOCYTE: 2.16 X 10 3/UL (ref 0.86–4.75)
ABSOLUTE MONOCYTE: 0.47 X 10 3/UL (ref 0.22–1.08)
ALBUMIN SERPL-MCNC: 4.4 G/DL (ref 3.5–5.2)
ALBUMIN/GLOB SERPL ELPH: 1 {RATIO} (ref 1–2.7)
ALP ISOS SERPL LEV INH-CCNC: 77 IU/L (ref 35–105)
ALT (SGPT): 13 U/L (ref 0–33)
ANION GAP SERPL CALC-SCNC: 13 MMOL/L (ref 8–17)
AST SERPL-CCNC: 24 U/L (ref 0–32)
BASOPHILS NFR BLD: 0.9 %
BILIRUBIN, TOTAL: 0.98 MG/DL (ref 0–1.2)
BUN/CREAT SERPL: 6.3 (ref 6–20)
CALCIUM SERPL-MCNC: 9.6 MG/DL (ref 8.6–10.2)
CARBON DIOXIDE, CO2: 25 MMOL/L (ref 22–29)
CHLORIDE: 87 MMOL/L (ref 98–107)
CREAT SERPL-MCNC: 0.98 MG/DL (ref 0.5–0.9)
EOSINOPHIL NFR BLD: 1.9 %
GFR ESTIMATION: 59.14
GLOBULIN: 4.5 G/DL (ref 1.5–4.5)
GLUCOSE: 86 MG/DL (ref 74–106)
HCT VFR BLD AUTO: 43 % (ref 37–47)
HGB BLD-MCNC: 15 G/DL (ref 12–16)
IMMATURE GRANULOCYTES: 0.2 % (ref 0–0.5)
LYMPHOCYTES NFR BLD: 50.9 %
Lab: NORMAL
MCH RBC QN AUTO: 33.1 PG (ref 27–32)
MCHC RBC AUTO-ENTMCNC: 34.9 G/DL (ref 32–36)
MCV RBC AUTO: 94.9 FL (ref 82–100)
MONOCYTES NFR BLD: 11.1 %
NEUTROPHILS ABSOLUTE COUNT: 1.48 X 10 3/UL (ref 2.15–7.56)
NEUTROPHILS NFR BLD: 35 %
NUCLEATED RED BLOOD CELLS: 0 /100 WBC (ref 0–0.2)
PLATELET # BLD AUTO: 156 X 10 3/UL (ref 135–400)
POTASSIUM: 2.5 MMOL/L (ref 3.5–5.1)
PROT SNV-MCNC: 8.9 G/DL (ref 6.4–8.3)
RBC # BLD AUTO: 4.53 X 10 6/UL (ref 4.2–5.4)
RDW-SD: 40.4 FL (ref 37–54)
SODIUM: 125 MMOL/L (ref 136–145)
UREA NITROGEN (BUN): 6.2 MG/DL (ref 6–20)
WBC # BLD: 4.24 X 10 3/UL (ref 4.3–10.8)

## 2019-04-10 NOTE — TELEPHONE ENCOUNTER
Received call from Parul with [facility] lab dept with a critical lab value.  Potassium 2.5. Read Back and Verified.    Call placed to Dr Ortiz.   Potassium, sodium and Cr results given.  Ask the patient if she is taking a diuretic?  Have the patient to Take her already prescribed potassium chloride (Klor-Con) 20 meq pack twice a day for the next 5 days (Wed-Sun) and Repeat Labs BMP on Monday 4/15/19.    Patient called and instructions given.  The patient is not on any diuretics.  She stated she did not like the taste of the potassium so she bought some OTC potassium and took may 2 or 3 pills of it.  It was stressed to the patient the OTC potassium was not working.   You must take what the Dr ordered.  Try to mix it in OJ or different drinks.  Will repeat Labs on Mon 4/15/19 at [facility], Fax # [phone] and Ph # [phone],  Orders faxed.  Voiced understanding.    Dr Ortiz updated.

## 2019-04-11 LAB
ANTICOAGULANT THERAPY: ABNORMAL
INR PPP: 1.2 (ref 0.9–1.1)
PROTIME: 11.7 S (ref 9.3–11.5)

## 2019-04-15 ENCOUNTER — EPISODE CHANGES (OUTPATIENT)
Dept: HEPATOLOGY | Facility: CLINIC | Age: 54
End: 2019-04-15

## 2019-04-15 ENCOUNTER — TELEPHONE (OUTPATIENT)
Dept: HEPATOLOGY | Facility: CLINIC | Age: 54
End: 2019-04-15

## 2019-04-15 LAB
ABSOLUTE BASOPHIL: 0.04
ABSOLUTE EOSINOPHIL: 0.08
ABSOLUTE IMMATURE GRAN: 0.01
ABSOLUTE LYMPHOCYTE: 2.16
ABSOLUTE MONOCYTE: 0.47
ALBUMIN, ELECTROPHORESIS: 3.85 G/DL (ref 3.2–5.3)
ALBUMIN/GLOB SERPL ELPH: 0.8 G/DL (ref 1–2.7)
ALBUMIN/GLOB SERPL ELPH: 1 {RATIO}
ALBUMIN/GLOB SERPL ELPH: 1 {RATIO} (ref 1–2.7)
ALBUMIN: 4.4 (ref 3.5–5.2)
ALP SERPL-CCNC: 77 U/L (ref 35–105)
ALPHA 1: 0.27 G/DL (ref 0.1–0.4)
ALPHA 2: 0.78 G/DL (ref 0.6–1)
ALT SERPL W P-5'-P-CCNC: 13 U/L (ref 0–33)
ANION GAP SERPL CALC-SCNC: 13 MMOL/L (ref 8–17)
AST SERPL-CCNC: 24 U/L (ref 0–32)
BASOPHILS NFR BLD: 1 %
BETA: 1.05 G/DL (ref 0.6–1.3)
BILIRUB SERPL-MCNC: 1 MG/DL (ref 0–1.2)
BUN BLD-MCNC: 6 MG/DL (ref 6–20)
BUN/CREAT SERPL: 6.3
BUN/CREAT SERPL: 6.3 (ref 6–20)
CALCIUM SERPL-MCNC: 9.6 MG/DL (ref 8.6–10.2)
CHLORIDE SERPL-SCNC: 87 MMOL/L (ref 98–107)
CO2 SERPL-SCNC: 25 MMOL/L (ref 22–29)
CREAT SERPL-MCNC: 1 MG/DL (ref 0.5–1)
EOSINOPHIL NFR BLD: 2 %
ERYTHROCYTE [DISTWIDTH] IN BLOOD BY AUTOMATED COUNT: 40.4 % (ref 37–54)
EXT ALBUMIN: 4.4
EXT ALKALINE PHOSPHATASE: 77
EXT ALT: 13
EXT ANION GAP: 13
EXT AST: 24
EXT BASOPHIL%: 0.9
EXT BASOPHILS (ABSOLUTE): 0.04
EXT BILIRUBIN TOTAL: 0.98
EXT BUN: 6.2
EXT CALCIUM: 9.6
EXT CHLORIDE: 87
EXT CO2: 25
EXT CREATININE: 0.98 MG/DL
EXT EOSINOPHIL%: 1.9
EXT EOSINOPHILS (ABSOLUTE): 0.08
EXT GLUCOSE: 86
EXT HEMATOCRIT: 43
EXT HEMOGLOBIN: 15
EXT LYMPH%: 50.9
EXT LYMPHS (ABSOLUTE): 2.16
EXT MCH: 33.1
EXT MCHC: 34.9
EXT MCV: 94.9
EXT MONOCYTES (ABSOLUTE): 0.47
EXT MONOCYTES%: 11.1
EXT NEUT%: 35
EXT NEUTROPHILS (ABSOLUTE): 1.48
EXT PLATELETS: 156
EXT POTASSIUM: 2.5
EXT PROTEIN TOTAL: 8.9
EXT PT: 11.7
EXT RBC: 4.53
EXT RDW: 40.4
EXT SODIUM: 125 MMOL/L
EXT WBC: 4.24
GAMMA: 2.74 G/DL (ref 0.7–1.5)
GFR ESTIMATION: 59.14
GFR: 59.14
GLOBULIN: 4.5
GLOBULIN: 4.5 (ref 1.5–4.5)
GLUCOSE SERPL-MCNC: 86 MG/DL (ref 74–106)
HCT VFR BLD AUTO: 43 % (ref 37–47)
HGB BLD-MCNC: 15 G/DL (ref 12–16)
IFE PARAPROTEIN, CSF: NOT DETECTED
IGA SERPL-MCNC: 291 MG/DL (ref 70–400)
IGG SERPL-MCNC: 2382 MG/DL (ref 700–1600)
IGM: 280 MG/DL (ref 40–230)
IMMATURE GRANS (ABS): 0.01
IMMATURE GRANULOCYTES: 0.2
IMMATURE GRANULOCYTES: 0.2
INR PPP: 1.2 (ref 2–3)
LYMPHOCYTES NFR BLD: 50.9 %
MCH RBC QN AUTO: 33.1 PG (ref 27–32)
MCHC RBC AUTO-ENTMCNC: 35 G/DL (ref 32–36)
MCV RBC AUTO: 94.9 FL (ref 82–100)
MONOCYTES NFR BLD MANUAL: 11 %
MULTIPLE ORDERS: NORMAL
NEUTROPHILS # BLD AUTO: 1.48 10*3/UL (ref 2.15–7.56)
NEUTROPHILS NFR BLD: 35 %
PEPSIN A INTERPRETATION: ABNORMAL
PLATELET # BLD AUTO: 156 K/?L (ref 135–400)
POTASSIUM SERPL-SCNC: 2.5 MMOL/L (ref 3.5–5.1)
PROFILE ORDER CODE: NORMAL
RBC # BLD AUTO: 4.53 10^6/ΜL (ref 4.2–5.4)
SODIUM BLD-SCNC: 125 MMOL/L (ref 135–145)
TOTAL PROTEIN: 8.9 G/DL (ref 6.4–8.3)
WBC # BLD AUTO: 4.2 10^3/ML (ref 4.3–10.8)

## 2019-04-18 ENCOUNTER — OFFICE VISIT (OUTPATIENT)
Dept: HEMATOLOGY/ONCOLOGY | Facility: CLINIC | Age: 54
End: 2019-04-18
Payer: MEDICAID

## 2019-04-18 VITALS
TEMPERATURE: 97 F | HEIGHT: 64 IN | OXYGEN SATURATION: 94 % | SYSTOLIC BLOOD PRESSURE: 104 MMHG | RESPIRATION RATE: 18 BRPM | BODY MASS INDEX: 20.49 KG/M2 | HEART RATE: 73 BPM | DIASTOLIC BLOOD PRESSURE: 75 MMHG | WEIGHT: 120 LBS

## 2019-04-18 DIAGNOSIS — D70.8 OTHER NEUTROPENIA: ICD-10-CM

## 2019-04-18 DIAGNOSIS — D69.6 THROMBOCYTOPENIA: ICD-10-CM

## 2019-04-18 DIAGNOSIS — B19.20 COMPENSATED CIRRHOSIS RELATED TO HEPATITIS C VIRUS (HCV): ICD-10-CM

## 2019-04-18 DIAGNOSIS — K74.69 COMPENSATED CIRRHOSIS RELATED TO HEPATITIS C VIRUS (HCV): ICD-10-CM

## 2019-04-18 DIAGNOSIS — D89.0 POLYCLONAL GAMMOPATHY: Primary | ICD-10-CM

## 2019-04-18 PROCEDURE — 99214 OFFICE O/P EST MOD 30 MIN: CPT | Mod: S$GLB,,, | Performed by: INTERNAL MEDICINE

## 2019-04-18 PROCEDURE — 99214 PR OFFICE/OUTPT VISIT, EST, LEVL IV, 30-39 MIN: ICD-10-PCS | Mod: S$GLB,,, | Performed by: INTERNAL MEDICINE

## 2019-04-18 RX ORDER — HYDROCHLOROTHIAZIDE 12.5 MG/1
1 CAPSULE ORAL DAILY
Refills: 5 | COMMUNITY
Start: 2019-03-31

## 2019-04-18 NOTE — PROGRESS NOTES
"PATIENT: Kasey Green  MRN: [id number]  DATE: 4/18/2019    Diagnosis:   1. Polyclonal gammopathy    2. Compensated cirrhosis related to hepatitis C virus (HCV)    3. Thrombocytopenia    4. Other neutropenia        Chief Complaint: polyclonal gammopathy    Subjective:    History of Present Illness: Ms. Braden Green is a 53 y.o. female who presented previously for evaluation and management of a polyclonal gammopathy. She has the following comorbid conditions: cirrhosis secondary to hepatitis C, low back pain.    Per hematologist/oncologist Dr. Hugo's note dated 2/18/19:  "Polyclonal gammopathy: Likely due to liver disease.  Labs reviewed with the patient. No further work up for Myeloma needed, recommend surveillance with repeat CBC with diff, CMP and SPEP in 3 months."    Interval history:  - she presents for a follow-up appointment for her polyclonal gammopathy.  - today she is doing well. She underwent labs last week. Her potassium level was 2.5 on labs from 4/10/19. When repeated on 4/15/19, it has increased to 4.2 mmol/L.  - she endorses weight loss. She denies encephalopathy, bleeding, ascites. She denies shortness of breath, chest pain, nausea, vomiting, diarrhea, constipation.  - she is scheduled to see hepatologist Dr. Ortiz in May 2019.    Past medical, surgical, family, and social histories have been reviewed and updated below.    Past Medical History:   Past Medical History:   Diagnosis Date    Anemia     Arthritis     Chronic pain     Cirrhosis of liver     Hepatitis     Hypertension     Seizures        Past Surgical History:   Past Surgical History:   Procedure Laterality Date    ABDOMINAL SURGERY      Small Bowel Resection     CHOLECYSTECTOMY      COLONOSCOPY N/A 8/25/2016    [facility]formed [facility] ENDO (2ND FLR)    ESOPHAGOGASTRODUODENOSCOPY (EGD) N/A 8/25/2016    Performed by [facility] at [facility] ENDO (2ND FLR)    HERNIA REPAIR      HYSTERECTOMY   "       Family History:   Family History   Problem Relation Age of Onset    Hypertension Sister        Social History:  reports that she has never smoked. She has never used smokeless tobacco. She reports that she drank alcohol. She reports that she does not use drugs.    Allergies:  Review of patient's allergies indicates:  No Known Allergies    Medications:  Current Outpatient Medications   Medication Sig Dispense Refill    amantadine HCl (SYMMETREL) 100 mg capsule Take 100 mg by mouth daily as needed.  4    amLODIPine (NORVASC) 10 MG tablet Take 10 mg by mouth once daily.      hydroCHLOROthiazide (MICROZIDE) 12.5 mg capsule Take 1 capsule by mouth once daily.  5    levETIRAcetam (KEPPRA) 500 MG Tab Take 500 mg by mouth 2 (two) times daily.      metoclopramide HCl (REGLAN) 10 MG tablet Take 10 mg by mouth 4 (four) times daily.      ondansetron (ZOFRAN) 4 MG tablet Take 4 mg by mouth 3 (three) times daily as needed.  4    potassium chloride (KLOR-CON) 20 mEq Pack Take 1 packet by mouth once daily.      propranolol (INDERAL) 20 MG tablet Take 20 mg by mouth once daily.      sucralfate (CARAFATE) 1 gram tablet Take 1 tablet by mouth 2 hours after meals and at bedtime.       No current facility-administered medications for this visit.        Review of Systems   Constitutional: Positive for fatigue and unexpected weight change.   HENT: Negative for sore throat.    Eyes: Negative for visual disturbance.   Respiratory: Negative for cough and shortness of breath.    Cardiovascular: Negative for chest pain and leg swelling.   Gastrointestinal: Negative for abdominal distention, abdominal pain, constipation, diarrhea, nausea and vomiting.   Genitourinary: Negative for dysuria.   Musculoskeletal: Positive for back pain.   Skin: Negative for rash.   Neurological: Negative for headaches.   Hematological: Negative for adenopathy.   Psychiatric/Behavioral: The patient is not nervous/anxious.        ECOG Performance  "Status:   ECOG SCORE 1       Objective:      Vitals:   Vitals:    04/18/19 0922   BP: 104/75   BP Location: Right arm   Patient Position: Sitting   BP Method: Large (Automatic)   Pulse: 73   Resp: 18   Temp: 96.7 °F (35.9 °C)   TempSrc: Temporal   SpO2: (!) 94%   Weight: 54.4 kg (120 lb)   Height: 5' 4" (1.626 m)     BMI: Body mass index is 20.6 kg/m².    Physical Exam   Constitutional: She is oriented to person, place, and time. She appears well-developed and well-nourished.   HENT:   Head: Normocephalic and atraumatic.   Eyes: Pupils are equal, round, and reactive to light. EOM are normal.   Neck: Normal range of motion. Neck supple.   Cardiovascular: Normal rate and regular rhythm.   Pulmonary/Chest: Effort normal and breath sounds normal.   Abdominal: Soft. Bowel sounds are normal.   Musculoskeletal: Normal range of motion.   Neurological: She is alert and oriented to person, place, and time.   Skin: Skin is warm and dry.   Psychiatric: She has a normal mood and affect. Her behavior is normal. Judgment and thought content normal.   Nursing note and vitals reviewed.      Laboratory Data:  Labs have been reviewed.    Lab Results   Component Value Date    WBC 4.2 (A) 04/10/2019    WBC 4.24 (L) 04/10/2019    HGB 15.0 04/10/2019    HGB 15.0 04/10/2019    HCT 43 04/10/2019    HCT 43.0 04/10/2019    MCV 94.9 04/10/2019     04/10/2019     04/10/2019     BMP (4/15/19);  Potassium 4.2 mmol/L    Imaging:     Ultrasound (2/8/19):    Assessment:       1. Polyclonal gammopathy    2. Compensated cirrhosis related to hepatitis C virus (HCV)    3. Thrombocytopenia    4. Other neutropenia           Plan:     1. Polyclonal gammopathy  - Labs have been reviewed.  - she has a polyclonal gammopathy secondary to chronic liver disease/cirrhosis.  - I reviewed her lab results with her in detail.  - no further workup or monitoring is indicated at this time.    2. Cirrhosis secondary to hepatitis C  - she has completed " "therapy for her hepatitis C. Per Dr. rOtiz's note, she was treated with "Mavyret x 12 weeks. She had end-of-treatment response on 10/20/18 with HCV RNA not detected. Now she is waiting for SVR 12, which will be on 1/12/19.  She is also on HCC surveillance every 6 months with AFP and U/S abd."  - MELD score off recent labs is 8  - last ultrasound in February 2019 was negative for any liver mass.  - defer further management and surveillance ultrasounds to Dr. Ortiz.    3. Neutropenia / thrombocytopenia  - Labs have been reviewed.  - absolute neutrophil count is 1.48 k/uL. Platelet count is 156 k/uL.  - these lab findings are secondary to her cirrhosis.  - these labs can be monitored intermittently by her hepatologist.    - return to clinic as needed.    Lux Ellison M.D.  Hematology/Oncology  [facility] - [address], [address]  [place] [address]  Phone: [phone]  Fax: [phone]    "

## 2019-04-24 ENCOUNTER — TELEPHONE (OUTPATIENT)
Dept: HEPATOLOGY | Facility: CLINIC | Age: 54
End: 2019-04-24

## 2019-04-24 LAB — HCV RNA SERPL QL NAA+PROBE: NOT DETECTED

## 2019-04-24 NOTE — TELEPHONE ENCOUNTER
----- Message from Tiffanie Ortiz MD sent at 4/24/2019  1:56 PM CDT -----  Pl inform patient:  Post-rx wk 24 on 4/6/19:    HCV RNA quant not detected. Hep C cure confirmed.  Reminder:   Continue AFP and u/s abd every 6 months, next due on 7/30/19.

## 2019-04-25 ENCOUNTER — TELEPHONE (OUTPATIENT)
Dept: HEPATOLOGY | Facility: CLINIC | Age: 54
End: 2019-04-25

## 2019-04-25 ENCOUNTER — EPISODE CHANGES (OUTPATIENT)
Dept: HEPATOLOGY | Facility: CLINIC | Age: 54
End: 2019-04-25

## 2019-04-25 NOTE — TELEPHONE ENCOUNTER
I spoke with patient and msg from Dr. Ortiz relayed.  HCC testing setup for patient locally on 7/15/19 and 1/13/20.  Orders mailed to patient and faxed to outside facilities.

## 2019-05-07 ENCOUNTER — TELEPHONE (OUTPATIENT)
Dept: HEPATOLOGY | Facility: CLINIC | Age: 54
End: 2019-05-07

## 2019-05-07 NOTE — TELEPHONE ENCOUNTER
----- Message from Ruben Pat LPN sent at 5/7/2019 10:12 AM CDT -----  Contact: Patient       ----- Message -----  From: Fern Ashraf  Sent: 5/7/2019   9:35 AM  To: [facility] Staff    Needs Advice    Reason for call: Reschedule appt        Communication Preference: [phone]     Additional Information: N/A

## 2019-06-06 PROBLEM — M41.24 OTHER IDIOPATHIC SCOLIOSIS, THORACIC REGION: Status: ACTIVE | Noted: 2019-06-06

## 2019-07-10 ENCOUNTER — TELEPHONE (OUTPATIENT)
Dept: HEPATOLOGY | Facility: CLINIC | Age: 54
End: 2019-07-10

## 2019-07-31 ENCOUNTER — TELEPHONE (OUTPATIENT)
Dept: HEPATOLOGY | Facility: CLINIC | Age: 54
End: 2019-07-31

## 2019-07-31 NOTE — TELEPHONE ENCOUNTER
Dr. Ortiz ordered that patient have HCC testing done locally on 7/15/19.  I spoke to patient who asked that orders be mailed to her again; done.

## 2019-09-06 ENCOUNTER — TELEPHONE (OUTPATIENT)
Dept: HEPATOLOGY | Facility: CLINIC | Age: 54
End: 2019-09-06

## 2019-09-06 LAB
ALBUMIN/GLOB SERPL ELPH: 1 {RATIO}
BUN/CREAT SERPL: 6.6
EXT AFP: 5.06 NG/ML
EXT ALBUMIN: 4.5
EXT ALKALINE PHOSPHATASE: 93
EXT ALT: 12
EXT AST: 20
EXT BASOPHIL%: 0.4
EXT BASOPHILS (ABSOLUTE): 0.02
EXT BILIRUBIN TOTAL: 1.05
EXT BUN: 6.5
EXT CALCIUM: 9.7
EXT CHLORIDE: 84
EXT CO2: 31
EXT CREATININE: 0.98 MG/DL
EXT EOSINOPHIL%: 0.6
EXT EOSINOPHILS (ABSOLUTE): 0.03
EXT GLUCOSE: 99
EXT HEMATOCRIT: 42.8
EXT HEMOGLOBIN: 15.3
EXT IMMATURE GRANULOCYTES %: 0.2
EXT IMMATURE GRANULOCYTES (ABSOLUTE): 0.01
EXT LYMPH%: 39.2
EXT LYMPHS (ABSOLUTE): 1.83
EXT MCH: 32.2
EXT MCHC: 35.7
EXT MCV: 90.1
EXT MONOCYTES (ABSOLUTE): 0.44
EXT MONOCYTES%: 9.4
EXT NEUT%: 50.2
EXT NEUTROPHILS (ABSOLUTE): 2.34
EXT PLATELETS: 149
EXT POTASSIUM: 2.9
EXT PROTEIN TOTAL: 9
EXT PT: 13.3
EXT RBC: 4.75
EXT RDW: 38.5
EXT SODIUM: 128 MMOL/L
EXT WBC: 4.67
GFR ESTIMATION: 59.14
GLOBULIN: 4.5
INR PPP: 1.2 (ref 2–3)
NRBC %: 0
NRBC: 0

## 2019-09-06 NOTE — TELEPHONE ENCOUNTER
Patient had labs drawn 9/3.  K+ 2.9.  Patient currently taking K+ 20 meq daily.  Per VORB from Dr. Ortiz patient to take K+ 40 meq X 3 days, then continue taking 20 meq daily.  Patient to increase K+ rich foods in her diet.  She is to have a BMP drawn on 9/10.  I spoke with patient and order from Dr. Ortiz relayed.  Lab order faxed to [facility] per patient's request.

## 2019-09-06 NOTE — TELEPHONE ENCOUNTER
Patient had abd US done locally at [facility] on 9/3/19 and report placed in Dr. Ortiz's folder for review.   Orders mailed to patient to have HCC testing done locally again on 3/3/20.

## 2019-09-10 ENCOUNTER — TELEPHONE (OUTPATIENT)
Dept: HEPATOLOGY | Facility: CLINIC | Age: 54
End: 2019-09-10

## 2019-09-10 NOTE — TELEPHONE ENCOUNTER
----- Message from Tiffanie Ortiz MD sent at 9/6/2019  4:21 PM CDT -----  Low potassium, as discussed give current dose twice daily x 3 days, then back to once daily.  Check potassium on Tuesday.

## 2019-09-20 ENCOUNTER — TELEPHONE (OUTPATIENT)
Dept: HEPATOLOGY | Facility: CLINIC | Age: 54
End: 2019-09-20

## 2019-09-20 LAB
BUN/CREAT SERPL: 9.4
EXT ANION GAP: 11
EXT BUN: 10.2
EXT CALCIUM: 9.7
EXT CHLORIDE: 97
EXT CO2: 25
EXT CREATININE: 1.08 MG/DL
EXT GLUCOSE: 90
EXT POTASSIUM: 4.7
EXT SODIUM: 133 MMOL/L
GFR ESTIMATION: 52.87

## 2019-09-23 ENCOUNTER — TELEPHONE (OUTPATIENT)
Dept: HEPATOLOGY | Facility: CLINIC | Age: 54
End: 2019-09-23

## 2019-09-23 NOTE — TELEPHONE ENCOUNTER
----- Message from Tiffanie Ortiz MD sent at 9/23/2019  9:19 AM CDT -----  Results of chemistry panel are ok.

## 2020-01-17 ENCOUNTER — TELEPHONE (OUTPATIENT)
Dept: HEPATOLOGY | Facility: CLINIC | Age: 55
End: 2020-01-17

## 2020-01-17 NOTE — TELEPHONE ENCOUNTER
Ultrasound of the abdomen September 3, 2019:  Cirrhosis seen.  No mass in the liver.  No fluid in the abdomen.  Normal spleen.    Please information no tumor was seen in the liver there is however cirrhosis in the liver.  Continues HCC surveillance with alpha-fetoprotein and ultrasound of the abdomen every 6 months next is due in March 2020

## 2020-03-12 ENCOUNTER — TELEPHONE (OUTPATIENT)
Dept: HEPATOLOGY | Facility: CLINIC | Age: 55
End: 2020-03-12

## 2020-03-12 NOTE — TELEPHONE ENCOUNTER
MA called patient back, she is due for labs, US and follow up visit. She accepted 5/14 in [place].     Mailed her external order for her labs and US so she can get this done locally before her visit     ( AFP,CBC, CMP, PT-INR, HCV RNA, US ABD)

## 2020-03-12 NOTE — TELEPHONE ENCOUNTER
----- Message from Divina Stone sent at 3/12/2020  2:31 PM CDT -----  Pt is returning a call from the nurse. She is not sure what the message was about. Call back [phone]

## 2020-04-27 ENCOUNTER — TELEPHONE (OUTPATIENT)
Dept: HEPATOLOGY | Facility: CLINIC | Age: 55
End: 2020-04-27

## 2020-04-27 NOTE — TELEPHONE ENCOUNTER
MA called patient to inform her that Dr. gonzalez will not be in [place] on 5/14. She understood convert her appt to AUDIO VISIT.

## 2020-05-14 ENCOUNTER — TELEPHONE (OUTPATIENT)
Dept: HEPATOLOGY | Facility: CLINIC | Age: 55
End: 2020-05-14

## 2020-05-14 ENCOUNTER — OFFICE VISIT (OUTPATIENT)
Dept: HEPATOLOGY | Facility: CLINIC | Age: 55
End: 2020-05-14
Payer: MEDICAID

## 2020-05-14 DIAGNOSIS — K74.69 COMPENSATED CIRRHOSIS RELATED TO HEPATITIS C VIRUS (HCV): ICD-10-CM

## 2020-05-14 DIAGNOSIS — K74.69 OTHER CIRRHOSIS OF LIVER: Primary | ICD-10-CM

## 2020-05-14 DIAGNOSIS — B19.20 COMPENSATED CIRRHOSIS RELATED TO HEPATITIS C VIRUS (HCV): ICD-10-CM

## 2020-05-14 DIAGNOSIS — B18.2 HEP C W/O COMA, CHRONIC: ICD-10-CM

## 2020-05-14 DIAGNOSIS — Z86.19 HISTORY OF HEPATITIS C: ICD-10-CM

## 2020-05-14 PROCEDURE — 99442 PR PHYSICIAN TELEPHONE EVALUATION 11-20 MIN: CPT | Mod: 95,,, | Performed by: INTERNAL MEDICINE

## 2020-05-14 PROCEDURE — 99442 PR PHYSICIAN TELEPHONE EVALUATION 11-20 MIN: ICD-10-PCS | Mod: 95,,, | Performed by: INTERNAL MEDICINE

## 2020-05-14 NOTE — PROGRESS NOTES
Established Patient - Audio Only Telehealth Visit     The patient location is: home  The chief complaint leading to consultation is: follow-up of cirrhosis  Visit type: Virtual visit with audio only (telephone)  Total time spent with patient: 15 min, then documentation till        The reason for the audio only service rather than synchronous audio and video virtual visit was related to technical difficulties or patient preference/necessity.     Each patient to whom I provide medical services by telemedicine is:  (1) informed of the relationship between the physician and patient and the respective role of any other health care provider with respect to management of the patient; and (2) notified that they may decline to receive medical services by telemedicine and may withdraw from such care at any time. Patient verbally consented to receive this service via voice-only telephone call.       HPI: see below    Assessment and plan:  See below          [facility] Follow-up Note    Reason for Consult:  The primary encounter diagnosis was Other cirrhosis of liver. Diagnoses of Hep C w/o coma, chronic and Compensated cirrhosis related to hepatitis C virus (HCV) were also pertinent to this visit.    PCP: Tawanda Carroll       Interval history:    Hep C treatment:  Was deferred until patient could be switched, (under the care of neurologist, Dr. Oppenheimer) from Dilantin and phenobarb to Keppra.  She made the switch, and had no seizures  for 3 months. Then her hep C was treated with Mavyret x 12 weeks. She had sustained virologic response (SVR) at 12 weeks (SVR12) after completing the treatment, and an additional 12 weeks later (SVR24), thus, confirming cure of hepatitis C.      Cirrhosis and HCC surveillance:    HCC surveillance every 6 months neg as of 9/3/2019, with AFP (5) and U/S abd (no focal lesion in liver).  She was due for repeat US and AFP in March 2020, requisitions were sent, but patient  postponed these due to COVID precautions.      Kasey also had MRI of spine from her PCP, Dr. Carroll's office, which was questionably positive for malignancy involving spine, and she was referred by Dr. Carroll to Oncologist at [facility] [place].    Seizure disorder:   On Keppra since switched prior to hep C treatment. Remains free of seizures.    Has swelling in her feet when she stands too long on concrete floor, she is a cook. They are not provided soft rubberized mats to stand on.  She denies abd pain, jaundice, itching, confusion/disorientation, memory problems.       HPI:  This is a 54 y.o. female here for evaluation of: hep C.    Patient with cirrhosis 2/2 ETOH abuse and Hepatitis C, rheumatoid arthritis, HTN, and seizure disorder on phenytoin, is referred here for hep C treatment.      Offers no new symptoms, continues to have chronic back pain.    Her hepatitis C was diagnosed approximately 2 years ago, she remains treatment naïve, she also has a history of alcohol in the past, her genotype is 1A, viral load was 5.1 million international units per mL in 2016, ultrasound of the abdomen from June 26, 2017 showed a nodular liver, borderline enlarged spleen, no mass was mentioned in an ultrasound.      Her labs on June 26, 2017 are significant for AST 74, ALT 47, total bilirubin 1.1, alpha-fetoprotein 12.7, INR 1.2, creatinine normal, GFR greater than 60.  Her hepatitis surface antibody is positive, hepatitis B antibody total was positive, HIV was negative, hemoglobin A1c was 4.7, rheumatoid arthritis factor was positive.    On October 8/9, 2017, patient went to the local emergency room at [facility] in [place], [place], for severe right-sided abdominal pain which originated in the low back and radiated towards the right flank and to the right side of the abdomen. A CT scan performed without contrast for a renal stone study showed a possible mass in the left lobe of the liver, due to the lack of  contrast could not be confirmed.  Also seen were findings consistent with cirrhosis and mild enlargement of the spleen.    Interval History:  Dr Carroll , patient's PCP Phone [phone], called [facility] clinic nurse, he reported neurologist Dr. Jose A Huff (phone [phone]) had ordered MRI wo contrast, which was done on 10/18/17, it showed diffuse patchy intermediate T1, T2 signal intensity throughout bone marrow which is concerning for metastatic bone marrow disease.  Splenomegaly. Disc bulge L4-L5.      I had requested MRI w/wo contrast of abd, due to indeterminate lesion in the liver, that is still pending. If mets exist, she will not be a candidate for liver transplant.     Previous admission to [facility] for UGI bleed 8/30/16:  Transferred from [facility] for evaluation of BRBPR. Patient was admitted to the ICU there after having 3 seizures at home and found to be in septic shock 2/2 E coli bacteremia. Patient subsequently developed DIC, which improved while she was treated for pneumonia with Vanc and Zosyn. On hospital admission Day#5, patient presented with abdominal distention and nausea and imaging showing high-grade small bowel obstruction. After failing conservative management, she underwent an ex-lap with SB partial resection and lysis of adhesion on 8/9. Postop course was complicated by worsening DIC requiring multiple transfusions of pRBC, FFP, and cryo. Patient also developed maroon-colored stools, and was managed with octretide and protonix gtt.      Scopes  EGD 8/17 - esophageal varices but no active bleeding, no report of bands being placed   Colonoscopy 8/20 - poor prep, there was evidence of old blood but no active bleeding     Course of Principle Problem for Admission:   Patient had repeat endoscopy performed on admission here to [facility] due to recurrent hematochezia with anemia requiring transfusion. EGD showed evidence of grade i esophageal varices, portal hypertensive  gastropathy, normal duodenum. Colonoscopy showed evidence of poor prep, with blood in the entire examined colon with no bleeding site found, thought to be small bowel in origin with most likely source being the anastomosis. Hg remained stable since that time with no recurrent episodes of hematochezia, as such patient will be transferred back to [facility] for continued care from her surgeons who performed recent bowel resection. Coagulopathy managed while inpatient with PRN transfusions of cryoprecipitate and FFP to maintain INR<1.5.     Other Medical Problems Addressed in the Hospital:   Patient also completed treatment of E Coli sepsis, and will transferred on PO Cipro daily for SBP prophylaxis despite no evidence of ascites amenable to paracentesis.        Pertinent/Significant Diagnostic Studies:    1. EGD/Colonoscopy: detailed above  2. CT Abdomen: Postsurgical bowel changes, noting mildly prominent small bowel loops in the midabdomen, increased in caliber as compared to the previous examination. This may reflect reactive ileus in this patient with suspected intra-abdominal hematoma and ascites, differential would include slow flow through the anastomotic site or possibly partial small bowel obstruction on the basis of adhesions although no sharp transition point seen. No free air or extraluminal contrast extravasation appreciated.      Elevated liver enzymes: Yes  Abnormal imaging: Yes  Cirrhosis: Yes  Hepatitis C: Cured  Hepatitis B: No  Fatty liver: Yes  Encephalopathy: No  Post-hospital discharge: No  Symptoms: none at present    Primary hepatic manifestations:  Fatigue:No  Edema:No  Ascites:No  Encephalopathy:No  Abdominal pain:No  GI bleeds: Yes  Pruritus:No  Weight Changes:No  Changes in Bowel habits: No  Muscle cramps:No    Risk factors for liver disease:  No jaundice  received transfusions in 2016 for UGI bleed  No IVDU  Did not snort cocaine or similar agents  Did not live with  anyone with hepatitis B or C  Sexual partner not tested  No hepatotoxic medications  No exposure to industrial toxins  Alcohol: used to drink, stopped 6 months ago, approx 4/2017 - I advised her contd abstinence      ROS:  Constitutional: No fevers, chills, weight changes, fatigue  ENT: No allergies, nosebleeds,   CV: No chest pain  Pulm: No cough, shortness of breath  Ophtho: No vision changes  GI/Liver: see HPI  Derm: No rash, itching  Heme: No swollen glands, bruising  MSK: No joint pains, joint swelling  : No dysuria, hematuria, decrease in urine output  Endo: No hot or cold intolerance  Neuro: No confusion, disorientation, difficulty with sleep, memory, concentration, syncope, seizure  Psych: No anxiety, depression    Medical History:  has a past medical history of Anemia, Arthritis, Chronic pain, Cirrhosis of liver, Hepatitis, Hypertension, and Seizures.    Surgical History:  has a past surgical history that includes Hysterectomy; Hernia repair; Cholecystectomy; Abdominal surgery; and Colonoscopy (N/A, 8/25/2016).    Family History: family history includes Hypertension in her sister..     Social History:  reports that she has never smoked. She has never used smokeless tobacco. She reports that she drank alcohol. She reports that she does not use drugs.    Review of patient's allergies indicates:  No Known Allergies    Current Outpatient Medications   Medication Sig    amantadine HCl (SYMMETREL) 100 mg capsule Take 100 mg by mouth daily as needed.    amLODIPine (NORVASC) 10 MG tablet Take 10 mg by mouth once daily.    hydroCHLOROthiazide (MICROZIDE) 12.5 mg capsule Take 1 capsule by mouth once daily.    levETIRAcetam (KEPPRA) 500 MG Tab Take 500 mg by mouth 2 (two) times daily.    metoclopramide HCl (REGLAN) 10 MG tablet Take 10 mg by mouth 4 (four) times daily.    potassium chloride (KLOR-CON) 20 mEq Pack Take 1 packet by mouth once daily.    propranolol (INDERAL) 20 MG tablet Take 20 mg by mouth once  daily.    ondansetron (ZOFRAN) 4 MG tablet Take 4 mg by mouth 3 (three) times daily as needed.    sucralfate (CARAFATE) 1 gram tablet Take 1 tablet by mouth 2 hours after meals and at bedtime.     No current facility-administered medications for this visit.        Objective Findings:    Vital Signs: Not done due to audio visit  LMP  (LMP Unknown)   There is no height or weight on file to calculate BMI.    Physical Exam:  Not performed due to audio visit.      Labs:  Lab Results   Component Value Date    WBC 4.2 (A) 04/10/2019    WBC 4.24 (L) 04/10/2019    HGB 15.0 04/10/2019    HGB 15.0 04/10/2019    HCT 43 04/10/2019    HCT 43.0 04/10/2019     04/10/2019     04/10/2019    INR 1.2 09/03/2019    CREATININE 1.0 04/10/2019    CREATININE 0.98 (H) 04/10/2019    BUN 6 04/10/2019    BUN 6.2 04/10/2019    BILITOT 1.0 04/10/2019    ALT 13 04/10/2019    AST 24 04/10/2019    AST 24 04/10/2019    ALKPHOS 77 04/10/2019     (A) 04/10/2019     (L) 04/10/2019    K 2.5 (A) 04/10/2019    K 2.5 (LL) 04/10/2019    CL 87 (A) 04/10/2019    CL 87 (L) 04/10/2019    CO2 25 04/10/2019    CO2 25 04/10/2019    TSH 2.761 08/20/2016    AFP 5.91 09/20/2018       Imaging:       Endoscopy:      Assessment:  1. Other cirrhosis of liver    2. Hep C w/o coma, chronic    3. Compensated cirrhosis related to hepatitis C virus (HCV)         Hepatitis C genotype 1A, treated and cured had SVR24.    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  Denies symptoms of swelling, jaundice, itching, confusion/disorietation.      HCC surveillance: boone AFP and US was neg until 9/3/2019, but did not get follow-up in 6 months which was in March 2020, due to COVID precautions.  But, now will go when it can be scheduled.      Prior CT reviewed in IR conf on 11/7/17:  Plan: CT from 10/27/17 shows a vague 3.6 cm area of hyperattenuation in the let lobe on non contrast portion with ?  Washout and possible subtle enhancement.  Hounsfield units are a little higher that the liver parenchyma.  This is indeterminate. Rec MRI now. it was finally done on 12/4/17, and no enhancing mass was seen on MRI.       Seizure disorder: Switch of anti-seizure medications to keppra, with control of seizures.  She ses neurologist, Dr Huff, who obtained and mRI of spine because of chronic back pain.. Patient unaware of any positive mRI spine findings but her PCP, Dr Carroll ([phone]) called our [facility] nurse and reported that neurologist Dr. Jose A Huff (phone [phone]) had ordered MRI wo contrast, which was done on 10/18/17, it showed diffuse patchy intermediate T1, T2 signal intensity throughout bone marrow which is concerning for metastatic bone marrow disease.  Splenomegaly. Disc bulge L4-L5.   We repeated her abd mRI, which included views of the spine, did not see metastatic disease.      Plan:  -  CBC, CMP, PT INR every 6 months, next due now, - send request to Pathology lab on [facility].   -  HCC surveillance with AFP and u/s abd every 6 months, next due now - patient requests send requisition for these to [facility].    -  Low salt in the diet, avoid canned, bottled and processed foods.  -  Continue current meds  -  I advised patient to continue alcohol abstinence as she is still at risk due to cirrhosis.  Also avoid smoking, sedatives and meds with codeine.  -  Avoid high intake of Tylenol (more than 4 extra-strength pills in one day)  -  Call us if any bleeding, fevers, confusion, disorientation occur  -  Endoscopy: Per local GI physician  -  Transplant option discussed, will evaluate if masses seen in the liver or when MELD >15.  -  Return in 1 year.      Follow up in about 1 year (around 5/14/2021).      Order summary:  No orders of the defined types were placed in this encounter.      Thank you so much for allowing me to participate in the care of Kasey Feliciano  Norma Ortiz MD                 This service was not originating from a related E/M service provided within the previous 7 days nor will  to an E/M service or procedure within the next 24 hours or my soonest available appointment.  Prevailing standard of care was able to be met in this audio-only visit.

## 2020-05-14 NOTE — PATIENT INSTRUCTIONS
Plan:  -  CBC, CMP, PT INR every 6 months, next due now, - send request to Pathology lab on [facility].   -  HCC surveillance with AFP and u/s abd every 6 months, next due now - patient requests send requisition for these to [facility].    -  Low salt in the diet, avoid canned, bottled and processed foods.  -  Continue current meds  -  I advised patient to continue alcohol abstinence as she is still at risk due to cirrhosis.  Also avoid smoking, sedatives and meds with codeine.  -  Avoid high intake of Tylenol (more than 4 extra-strength pills in one day)  -  Call us if any bleeding, fevers, confusion, disorientation occur  -  Endoscopy: Per local GI physician  -  Transplant option discussed, will evaluate if masses seen in the liver or when MELD >15.  -  Return in 1 year.

## 2020-05-14 NOTE — Clinical Note
Plan:-  CBC, CMP, PT INR every 6 months, next due now, - send request to Pathology lab on [facility]. -  HCC surveillance with AFP and u/s abd every 6 months, next due now - patient requests send requisition for these to [facility].  -  Low salt in the diet, avoid canned, bottled and processed foods.-  Continue current meds-  I advised patient to continue alcohol abstinence as she is still at risk due to cirrhosis.  Also avoid smoking, sedatives and meds with codeine.-  Avoid high intake of Tylenol (more than 4 extra-strength pills in one day)-  Call us if any bleeding, fevers, confusion, disorientation occur-  Endoscopy: Per local GI physician-  Transplant option discussed, will evaluate if masses seen in the liver or when MELD >15.-  Return in 1 year.

## 2020-05-14 NOTE — TELEPHONE ENCOUNTER
----- Message from Tiffanie Ortiz MD sent at 5/14/2020  8:47 AM CDT -----  Plan:  -  CBC, CMP, PT INR every 6 months, next due now, - send request to Pathology lab on [facility].   -  HCC surveillance with AFP and u/s abd every 6 months, next due now - patient requests send requisition for these to [facility].    -  Low salt in the diet, avoid canned, bottled and processed foods.  -  Continue current meds  -  I advised patient to continue alcohol abstinence as she is still at risk due to cirrhosis.  Also avoid smoking, sedatives and meds with codeine.  -  Avoid high intake of Tylenol (more than 4 extra-strength pills in one day)  -  Call us if any bleeding, fevers, confusion, disorientation occur  -  Endoscopy: Per local GI physician  -  Transplant option discussed, will evaluate if masses seen in the liver or when MELD >15.  -  Return in 1 year.

## 2020-05-15 ENCOUNTER — TELEPHONE (OUTPATIENT)
Dept: HEPATOLOGY | Facility: CLINIC | Age: 55
End: 2020-05-15

## 2020-05-15 NOTE — TELEPHONE ENCOUNTER
Attempted to call patient to see when she had that liver biopsy done so we can request it. Need to know the exact facility where she had it done. She is unable to reached no VM SET up.

## 2020-05-15 NOTE — TELEPHONE ENCOUNTER
Patient would like to get her labs at THE PATHOLOGY LAB at [facility]     P# [phone]  F# [phone]    Fax lab order to the facility CBC, CMP, PT and AFP    She want to get there ultrasound done at     [facility]    P# [phone]  F# [phone]    Faxed US order to the facility also she is scheduled 5/26.

## 2020-05-15 NOTE — TELEPHONE ENCOUNTER
----- Message from Tiffanie Ortiz MD sent at 5/14/2020  8:47 AM CDT -----  Plan:  -  CBC, CMP, PT INR every 6 months, next due now, - send request to Pathology lab on [facility].   -  HCC surveillance with AFP and u/s abd every 6 months, next due now - patient requests send requisition for these to [facility].    -  Low salt in the diet, avoid canned, bottled and processed foods.  -  Continue current meds  -  I advised patient to continue alcohol abstinence as she is still at risk due to cirrhosis.  Also avoid smoking, sedatives and meds with codeine.  -  Avoid high intake of Tylenol (more than 4 extra-strength pills in one day)  -  Call us if any bleeding, fevers, confusion, disorientation occur  -  Endoscopy: Per local GI physician  -  Transplant option discussed, will evaluate if masses seen in the liver or when MELD >15.  -  Return in 1 year.

## 2020-06-01 ENCOUNTER — TELEPHONE (OUTPATIENT)
Dept: HEPATOLOGY | Facility: CLINIC | Age: 55
End: 2020-06-01

## 2020-06-01 NOTE — TELEPHONE ENCOUNTER
----- Message from Jai Ellington sent at 6/1/2020  8:31 AM CDT -----  Contact: Nirmala: [facility].  Pt needs standing orders faxed over      [phone](O)    [phone](F)    [phone](A)

## 2020-06-02 ENCOUNTER — TELEPHONE (OUTPATIENT)
Dept: HEPATOLOGY | Facility: CLINIC | Age: 55
End: 2020-06-02

## 2020-06-02 NOTE — TELEPHONE ENCOUNTER
Jeff called and asked that we fax the pt lab orders ASAP to [phone]. I faxed it through epic. It's showing that the orders went through

## 2020-06-02 NOTE — TELEPHONE ENCOUNTER
----- Message from Alvin Patel sent at 6/2/2020 12:17 PM CDT -----  Contact: [facility]  Calling to get pt ultra sound lab orders faxed over ASAP pt is at clinic now but no orders are in    Fax: [phone]

## 2020-12-03 ENCOUNTER — TELEPHONE (OUTPATIENT)
Dept: HEPATOLOGY | Facility: CLINIC | Age: 55
End: 2020-12-03

## 2020-12-08 ENCOUNTER — TELEPHONE (OUTPATIENT)
Dept: HEPATOLOGY | Facility: CLINIC | Age: 55
End: 2020-12-08

## 2020-12-08 DIAGNOSIS — Z86.19 HISTORY OF HEPATITIS C: ICD-10-CM

## 2020-12-08 DIAGNOSIS — K74.69 OTHER CIRRHOSIS OF LIVER: Primary | ICD-10-CM

## 2020-12-08 DIAGNOSIS — B19.20 COMPENSATED CIRRHOSIS RELATED TO HEPATITIS C VIRUS (HCV): ICD-10-CM

## 2020-12-08 DIAGNOSIS — K74.69 COMPENSATED CIRRHOSIS RELATED TO HEPATITIS C VIRUS (HCV): ICD-10-CM

## 2020-12-08 NOTE — TELEPHONE ENCOUNTER
----- Message from Jai Ellington sent at 12/8/2020  1:37 PM CST -----          Pt calling to speak with nurse. Declined to say why              [phone] (M)

## 2020-12-09 NOTE — TELEPHONE ENCOUNTER
FAXED LAB AND ULTRASOUND ORDER TO [facility] AT FAX# [phone].    They will call patient to schedule her appointment.

## 2022-02-15 NOTE — PROGRESS NOTES
[facility] Consultation Note    Reason for Consult:  The primary encounter diagnosis was Chronic hepatitis C without hepatic coma. Diagnoses of Cirrhosis of liver without ascites, unspecified hepatic cirrhosis type and Abnormal finding on imaging were also pertinent to this visit.    PCP: Primary Doctor No   [id number] / [place] [id number]    HPI:  This is a 51 y.o. female here for evaluation of: hep C.    Patient with cirrhosis 2/2 ETOH abuse and Hepatitis C, rheumatoid arthritis, HTN, and seizure disorder on phenytoin, is referred here for hep C treatment.      Her hepatitis C was diagnosed approximately 2 years ago, she remains treatment naïve, she also has a history of alcohol in the past, her genotype is 1A, viral load was 5.1 million international units per mL in 2016, ultrasound of the abdomen from June 26, 2017 showed a nodular liver, borderline enlarged spleen, no mass was mentioned in an ultrasound.      Her labs on June 26, 2017 are significant for AST 74, ALT 47, total bilirubin 1.1, alpha-fetoprotein 12.7, INR 1.2, creatinine normal, GFR greater than 60.  Her hepatitis surface antibody is positive, hepatitis B antibody total was positive, HIV was negative, hemoglobin A1c was 4.7, rheumatoid arthritis factor was positive.    Past weekend on October 8/9, 2017, patient went to the local emergency room at [facility] in [place], [place], for severe right-sided abdominal pain which originated in the low back and radiated towards the right flank and to the right side of the abdomen. A CT scan performed without contrast for a renal stone study showed a possible mass in the left lobe of the liver, due to the lack of contrast could not be confirmed.  Also seen were findings consistent with cirrhosis and mild enlargement of the spleen.    Previous admission to [facility] for UGI bleed 8/30/16:  Transferred from [facility] for evaluation of BRBPR. Patient was  admitted to the ICU there after having 3 seizures at home and found to be in septic shock 2/2 E coli bacteremia. Patient subsequently developed DIC, which improved while she was treated for pneumonia with Vanc and Zosyn. On hospital admission Day#5, patient presented with abdominal distention and nausea and imaging showing high-grade small bowel obstruction. After failing conservative management, she underwent an ex-lap with SB partial resection and lysis of adhesion on 8/9. Postop course was complicated by worsening DIC requiring multiple transfusions of pRBC, FFP, and cryo. Patient also developed maroon-colored stools, and was managed with octretide and protonix gtt.      Scopes  EGD 8/17 - esophageal varices but no active bleeding, no report of bands being placed   Colonoscopy 8/20 - poor prep, there was evidence of old blood but no active bleeding     Course of Principle Problem for Admission:   Patient had repeat endoscopy performed on admission here to [facility] due to recurrent hematochezia with anemia requiring transfusion. EGD showed evidence of grade i esophageal varices, portal hypertensive gastropathy, normal duodenum. Colonoscopy showed evidence of poor prep, with blood in the entire examined colon with no bleeding site found, thought to be small bowel in origin with most likely source being the anastomosis. Hg remained stable since that time with no recurrent episodes of hematochezia, as such patient will be transferred back to [facility] for continued care from her surgeons who performed recent bowel resection. Coagulopathy managed while inpatient with PRN transfusions of cryoprecipitate and FFP to maintain INR<1.5.     Other Medical Problems Addressed in the Hospital:   Patient also completed treatment of E Coli sepsis, and will transferred on PO Cipro daily for SBP prophylaxis despite no evidence of ascites amenable to paracentesis.        Pertinent/Significant Diagnostic Studies:     1. EGD/Colonoscopy: detailed above  2. CT Abdomen: Postsurgical bowel changes, noting mildly prominent small bowel loops in the midabdomen, increased in caliber as compared to the previous examination. This may reflect reactive ileus in this patient with suspected intra-abdominal hematoma and ascites, differential would include slow flow through the anastomotic site or possibly partial small bowel obstruction on the basis of adhesions although no sharp transition point seen. No free air or extraluminal contrast extravasation appreciated.      Elevated liver enzymes: Yes  Abnormal imaging: Yes  Cirrhosis: Yes  Hepatitis C: Yes  Hepatitis B: No  Fatty liver: Yes  Encephalopathy: No  Post-hospital discharge: No  Symptoms: none    Primary hepatic manifestations:  Fatigue:No  Edema:No  Ascites:No  Encephalopathy:No  Abdominal pain:No  GI bleeds: Yes  Pruritus:No  Weight Changes:No  Changes in Bowel habits: No  Muscle cramps:No    Risk factors for liver disease:  No jaundice  received transfusions in 2016 for UGI bleed  No IVDU  Did not snort cocaine or similar agents  Did not live with anyone with hepatitis B or C  Sexual partner not tested  No hepatotoxic medications  No exposure to industrial toxins  Alcohol: used to drink, stopped 6 months ago, approx 4/2017      ROS:  Constitutional: No fevers, chills, weight changes, fatigue  ENT: No allergies, nosebleeds,   CV: No chest pain  Pulm: No cough, shortness of breath  Ophtho: No vision changes  GI/Liver: see HPI  Derm: No rash, itching  Heme: No swollen glands, bruising  MSK: No joint pains, joint swelling  : No dysuria, hematuria, decrease in urine output  Endo: No hot or cold intolerance  Neuro: No confusion, disorientation, difficulty with sleep, memory, concentration, syncope, seizure  Psych: No anxiety, depression    Medical History:  has a past medical history of Arthritis; Chronic pain; Cirrhosis of liver; Hepatitis; Hypertension; and Seizures.    Surgical  "History:  has a past surgical history that includes Hysterectomy; Hernia repair; Cholecystectomy; Abdominal surgery; and Colonoscopy (N/A, 8/25/2016).    Family History: family history is not on file..     Social History:  reports that she has quit smoking. She does not have any smokeless tobacco history on file. She reports that she drinks alcohol. She reports that she does not use drugs.    Review of patient's allergies indicates:  No Known Allergies    Current Outpatient Prescriptions   Medication Sig    phenytoin (DILANTIN) 100 MG ER capsule Take 100 mg by mouth 3 (three) times daily.     levetiracetam (KEPPRA) 500 MG Tab Take 500 mg by mouth once daily.    megestrol (MEGACE) 400 mg/10 mL (40 mg/mL) Susp Take 800 mg by mouth once daily.    ondansetron HCl, PF, 4 mg/2 mL Soln Inject 8 mg into the vein every 8 (eight) hours as needed.    pantoprazole (PROTONIX) 40 MG tablet Take 1 tablet (40 mg total) by mouth once daily.    propranolol (INDERAL) 20 MG tablet Take 1 tablet (20 mg total) by mouth 2 (two) times daily.    ramelteon (ROZEREM) 8 mg tablet Take 1 tablet (8 mg total) by mouth nightly as needed for Insomnia.    senna-docusate 8.6-50 mg (PERICOLACE) 8.6-50 mg per tablet Take 1 tablet by mouth once daily.     No current facility-administered medications for this visit.        Objective Findings:    Vital Signs:  BP (!) 176/116   Pulse 60   Resp 18   Ht 5' 3" (1.6 m)   Wt 55.3 kg (122 lb)   LMP  (LMP Unknown)   BMI 21.61 kg/m²   Body mass index is 21.61 kg/m².    Physical Exam:  General Appearance: Well appearing in no acute distress  Head:   Normocephalic, without obvious abnormality  Eyes:    No scleral icterus, EOMI  ENT: Neck supple, Lips, mucosa, and tongue normal; teeth and gums normal  Lungs: CTA bilaterally in anterior and posterior fields, no wheezes, no crackles.  Heart:  Regular rate and rhythm, S1, S2 normal, no murmurs heard  Abdomen: Soft, non tender, non distended with positive " bowel sounds in all four quadrants. No hepatosplenomegaly, ascites, or mass  Extremities: 2+ pulses, no clubbing, cyanosis or edema  Skin: No rash  Neurologic: CN II-XII intact, alert, oriented x 3. No asterixis      Labs:  Lab Results   Component Value Date    WBC 4.2 06/26/2017    HGB 13.1 06/26/2017    HCT 38 06/26/2017     (A) 06/26/2017    INR 1.1 06/26/2017    CREATININE 0.6 06/26/2017    BUN 6 06/26/2017    BILITOT 2.3 (H) 08/30/2016    BILITOT 2.3 (H) 08/30/2016    ALT 47 06/26/2017    AST 74 06/26/2017    ALKPHOS 55 08/30/2016    ALKPHOS 55 08/30/2016     06/26/2017    K 3.6 06/26/2017     06/26/2017    CO2 26 06/26/2017    TSH 2.761 08/20/2016    AFP 3.8 08/21/2016       Imaging:       Endoscopy:      Assessment:  1. Chronic hepatitis C without hepatic coma    2. Cirrhosis of liver without ascites, unspecified hepatic cirrhosis type    3. Abnormal finding on imaging     hepatitis C genotype 1A, treatment naïve, needs to be treated after confirming the status of the liver mass that was seen on a CT scan on October 8/9 2017.  If she has confirmed HCC, would wait until after potential transplant for treatment for hep C will be started.  If on the other hand there is no mass found in the liver, then we will proceed with Hep C treatment.  However, she needs to be off the phenytoin and switch to another anti-seizure medication which will not interact with Hep C medications.  In order to make the switch of anti-seizure medications, she would need to see her neurologist in [place], and be observed for 2-3 months to see if she has seizures are not, before starting hepatitis C medication.    The possible mass in the left lobe of the liver needs to be evaluated, with triple phase CT scan of the abdomen which could be done locally at [place] at the [facility] as the last CT without contrast performed at the emergency room of [facility].  I have given the patient a  paper order to have this done, and a CD with the downloaded the scan is also requested on the paper order.  We will review her CT scans in our IR conference and make a decision regarding presence of a mass, the nature of the mass, treatment recommended a bone mass is confirmed.  ADDENDUM: MELD today 8.      Recommendations:  -  Labs today:    CBC, CMP, PT INR, AFP, - MELD 8 today  -  Triple phase CT of the abdomen to be done locally at [facility] in [place].  -  Submit CD with the above CT and the that was performed in the emergency room to IR conference for further evaluation of the area in the left lobe of the liver, and recommendation for further treatment/follow-up.  -  Patient has been advised to see her neurologist to switch her medication, phenytoin, to another anti-seizure medication, observed 2-3 months for seizure recurrence on the new medication, and then return for hepatitis C treatment if no masses seen in the liver.    -  If a mass is seen in the liver, we may prefer to wait until a possible liver transplant for instituting hepatitis C treatment.  -  Low salt in the diet, avoid canned, bottled and processed foods.  -  Continue current meds  -  CBC, CMP, PT INR today, followed by every 3 months  -  Ultrasound of abdomen and AFP every 6 months, next due in April 2018  -  Avoid alcohol, smoking, sedatives and meds with codeine.  -  No driving  -  Avoid high intake of Tylenol (more than 4 extra-strength pills in one day)  -  Call us if any bleeding, fevers, confusion, disorientation occur  -  Endoscopy: Per local GI physician  -  Transplant option discussed, will evaluate if masses seen in the liver or when MELD >15.  -  Return in 6-8 weeks.          No Follow-up on file.      Order summary:  No orders of the defined types were placed in this encounter.      Thank you so much for allowing me to participate in the care of Gagan Ortiz MD   [employer] Counseling:  I discussed with the patient the risks of mycophenolate mofetil including but not limited to infection/immunosuppression, GI upset, hypokalemia, hypercholesterolemia, bone marrow suppression, lymphoproliferative disorders, malignancy, GI ulceration/bleed/perforation, colitis, interstitial lung disease, kidney failure, progressive multifocal leukoencephalopathy, and birth defects.  The patient understands that monitoring is required including a baseline creatinine and regular CBC testing. In addition, patient must alert us immediately if symptoms of infection or other concerning signs are noted.

## 2022-05-03 NOTE — TELEPHONE ENCOUNTER
----- Message from Arden Garcia sent at 7/6/2018 12:34 PM CDT -----  Regarding: HCV RNA & INR - required labs  Good afternoon Dr. Ortiz,     I am working on the prior authorization for [facility] but missing labs: HCV RNA & INR (required to be within 3 months). Please let me know when you have the result & I can submit the prior authorization to insurance company.     Thank you,  Arden  [phone]   Former smoker

## 2023-06-17 NOTE — TELEPHONE ENCOUNTER
----- Message from Darlene Alvarez sent at 1/2/2018  9:45 AM CST -----  Contact: Pt  Please call to schedule appt, [phone]   
MA called patient inform her of her MRI results and per Dr. gonzalez she will need to have US done now. Mailed external order to patient she will get this done locally. [place]  
good minus